# Patient Record
Sex: FEMALE | Race: BLACK OR AFRICAN AMERICAN | NOT HISPANIC OR LATINO | Employment: UNEMPLOYED | URBAN - NONMETROPOLITAN AREA
[De-identification: names, ages, dates, MRNs, and addresses within clinical notes are randomized per-mention and may not be internally consistent; named-entity substitution may affect disease eponyms.]

---
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
## 2023-01-03 ENCOUNTER — ROUTINE PRENATAL (OUTPATIENT)
Dept: OBSTETRICS AND GYNECOLOGY | Facility: CLINIC | Age: 18
End: 2023-01-03
Payer: MEDICAID

## 2023-01-03 ENCOUNTER — PROCEDURE VISIT (OUTPATIENT)
Dept: OBSTETRICS AND GYNECOLOGY | Facility: CLINIC | Age: 18
End: 2023-01-03
Payer: MEDICAID

## 2023-01-03 VITALS
SYSTOLIC BLOOD PRESSURE: 115 MMHG | HEART RATE: 80 BPM | WEIGHT: 167.38 LBS | BODY MASS INDEX: 30.62 KG/M2 | DIASTOLIC BLOOD PRESSURE: 76 MMHG

## 2023-01-03 DIAGNOSIS — R11.2 NAUSEA AND VOMITING, UNSPECIFIED VOMITING TYPE: ICD-10-CM

## 2023-01-03 DIAGNOSIS — Z3A.08 8 WEEKS GESTATION OF PREGNANCY: ICD-10-CM

## 2023-01-03 DIAGNOSIS — Z83.3 FAMILY HISTORY OF DIABETES MELLITUS: ICD-10-CM

## 2023-01-03 DIAGNOSIS — Z3A.08 8 WEEKS GESTATION OF PREGNANCY: Primary | ICD-10-CM

## 2023-01-03 LAB
BILIRUB SERPL-MCNC: NORMAL MG/DL
BLOOD, POC UA: NORMAL
GLUCOSE SERPL-MCNC: 87 MG/DL (ref 74–106)
GLUCOSE UR QL STRIP: NORMAL
KETONES UR QL STRIP: NORMAL
LEUKOCYTE ESTERASE URINE, POC: NORMAL
NITRITE, POC UA: NORMAL
PH, POC UA: 7.5
PROTEIN, POC: NORMAL
SPECIFIC GRAVITY, POC UA: 1.02
UROBILINOGEN, POC UA: 0.2

## 2023-01-03 PROCEDURE — 36415 PR COLLECTION VENOUS BLOOD,VENIPUNCTURE: ICD-10-PCS | Mod: ,,, | Performed by: ADVANCED PRACTICE MIDWIFE

## 2023-01-03 PROCEDURE — 82950 GLUCOSE, 1HR POST PRANDIAL: ICD-10-PCS | Mod: ,,, | Performed by: CLINICAL MEDICAL LABORATORY

## 2023-01-03 PROCEDURE — 82950 GLUCOSE TEST: CPT | Mod: ,,, | Performed by: CLINICAL MEDICAL LABORATORY

## 2023-01-03 PROCEDURE — 76801 PR US, OB <14WKS, TRANSABD, SINGLE GESTATION: ICD-10-PCS | Mod: ,,, | Performed by: OBSTETRICS & GYNECOLOGY

## 2023-01-03 PROCEDURE — 99213 PR OFFICE/OUTPT VISIT, EST, LEVL III, 20-29 MIN: ICD-10-PCS | Mod: 25,TH,, | Performed by: ADVANCED PRACTICE MIDWIFE

## 2023-01-03 PROCEDURE — 99499 UNLISTED E&M SERVICE: CPT | Mod: ,,, | Performed by: OBSTETRICS & GYNECOLOGY

## 2023-01-03 PROCEDURE — 96372 THER/PROPH/DIAG INJ SC/IM: CPT | Mod: ,,, | Performed by: ADVANCED PRACTICE MIDWIFE

## 2023-01-03 PROCEDURE — 96372 PR INJECTION,THERAP/PROPH/DIAG2ST, IM OR SUBCUT: ICD-10-PCS | Mod: ,,, | Performed by: ADVANCED PRACTICE MIDWIFE

## 2023-01-03 PROCEDURE — 76801 OB US < 14 WKS SINGLE FETUS: CPT | Mod: ,,, | Performed by: OBSTETRICS & GYNECOLOGY

## 2023-01-03 PROCEDURE — 99213 OFFICE O/P EST LOW 20 MIN: CPT | Mod: 25,TH,, | Performed by: ADVANCED PRACTICE MIDWIFE

## 2023-01-03 PROCEDURE — 36415 COLL VENOUS BLD VENIPUNCTURE: CPT | Mod: ,,, | Performed by: ADVANCED PRACTICE MIDWIFE

## 2023-01-03 PROCEDURE — 99499 NO LOS: ICD-10-PCS | Mod: ,,, | Performed by: OBSTETRICS & GYNECOLOGY

## 2023-01-03 RX ORDER — ONDANSETRON 2 MG/ML
4 INJECTION INTRAMUSCULAR; INTRAVENOUS ONCE
Status: COMPLETED | OUTPATIENT
Start: 2023-01-03 | End: 2023-01-03

## 2023-01-03 RX ADMIN — ONDANSETRON 4 MG: 2 INJECTION INTRAMUSCULAR; INTRAVENOUS at 03:01

## 2023-01-03 NOTE — PROGRESS NOTES
17 y.o. female  at 8w3d   She c/o prenatal vitamins causing nausea- may take folic acid and Flinstone vitamins until able to tolerate prenatal vitamins  Reports no fetal movement or fluttering. Denies any vaginal bleeding, leakage of fluid, cramping, contractions, or pressure.   Labs reviewed  Total weight gain/weight loss in pregnancy: -0.386 kg (-13.6 oz)     Vitals  BP: 115/76  Pulse: 80  Weight: 75.9 kg (167 lb 6.4 oz)    Prenatal Labs:  Lab Results   Component Value Date    GROUPTRH B POS 2022    HGB 13.6 2022    HCT 41.2 2022     2022    SICKLE Negative 2022    HEPBSAG Non-Reactive 2022    [id number] Non-Reactive 2022    LABNGO Negative 2022    LABURIN Skin/Urogenital Samra Isolated, no further workup. 2022       A: 8w3d           ICD-10-CM ICD-9-CM    1. 8 weeks gestation of pregnancy  Z3A.08 V22.2 POCT URINALYSIS      2. Family history of diabetes mellitus  Z83.3 V18.0 Glucose, 1Hr Post Prandial      3. BMI 30.0-30.9,adult  Z68.30 V85.30           P: Bleeding, daily fetal kick counts, and  labor/labor precautions discussed.    The following were addressed during this visit:    8-12 Weeks  - Review lab tests   - Genetic Counseling (NT/[facility]min[facility])   - Influenza IM (for due date  - 3/31)   - Non-pharmacologic Pain Relief Methods for Labor & Birth       Questions answered to desired level of satisfaction  Verbalized understanding to all information and instructions provided.  Follow up in about 4 weeks (around 2023), or if symptoms worsen or fail to improve, for ESPERANZA visit.    Helen Hoff, ULZ, CNM, WHNP-BC

## 2023-01-31 ENCOUNTER — ROUTINE PRENATAL (OUTPATIENT)
Dept: OBSTETRICS AND GYNECOLOGY | Facility: CLINIC | Age: 18
End: 2023-01-31
Payer: MEDICAID

## 2023-01-31 VITALS
WEIGHT: 159.19 LBS | BODY MASS INDEX: 29.12 KG/M2 | SYSTOLIC BLOOD PRESSURE: 109 MMHG | HEART RATE: 83 BPM | DIASTOLIC BLOOD PRESSURE: 70 MMHG

## 2023-01-31 DIAGNOSIS — B96.89 BV (BACTERIAL VAGINOSIS): ICD-10-CM

## 2023-01-31 DIAGNOSIS — N76.0 BV (BACTERIAL VAGINOSIS): ICD-10-CM

## 2023-01-31 DIAGNOSIS — F12.91 HISTORY OF MARIJUANA USE: ICD-10-CM

## 2023-01-31 DIAGNOSIS — R63.4 WEIGHT LOSS: ICD-10-CM

## 2023-01-31 DIAGNOSIS — Z36.89 ENCOUNTER FOR OTHER SPECIFIED ANTENATAL SCREENING: ICD-10-CM

## 2023-01-31 DIAGNOSIS — Z3A.12 12 WEEKS GESTATION OF PREGNANCY: Primary | ICD-10-CM

## 2023-01-31 LAB
BILIRUB SERPL-MCNC: NORMAL MG/DL
BLOOD, POC UA: NORMAL
CTP QC/QA: YES
GLUCOSE UR QL STRIP: NORMAL
KETONES UR QL STRIP: NORMAL
LEUKOCYTE ESTERASE URINE, POC: NORMAL
NITRITE, POC UA: NORMAL
PH, POC UA: 7
POC (AMP) AMPHETAMINE: NEGATIVE
POC (BAR) BARBITURATES: NEGATIVE
POC (BUP) BUPRENORPHINE: NEGATIVE
POC (BZO) BENZODIAZEPINES: NEGATIVE
POC (COC) COCAINE: NEGATIVE
POC (MDMA) METHYLENEDIOXYMETHAMPHETAMINE 3,4: NEGATIVE
POC (MET) METHAMPHETAMINE: NEGATIVE
POC (MOP) OPIATES: NEGATIVE
POC (MTD) METHADONE: NEGATIVE
POC (OXY) OXYCODONE: NEGATIVE
POC (PCP) PHENCYCLIDINE: NEGATIVE
POC (TCA) TRICYCLIC ANTIDEPRESSANTS: NEGATIVE
POC TEMPERATURE (URINE): 94
POC THC: ABNORMAL
PROTEIN, POC: NORMAL
SPECIFIC GRAVITY, POC UA: 1.02
UROBILINOGEN, POC UA: 0.2

## 2023-01-31 PROCEDURE — 99213 OFFICE O/P EST LOW 20 MIN: CPT | Mod: TH,,, | Performed by: ADVANCED PRACTICE MIDWIFE

## 2023-01-31 PROCEDURE — 80305 POCT URINE DRUG SCREEN PRESUMP: ICD-10-PCS | Mod: QW,,, | Performed by: ADVANCED PRACTICE MIDWIFE

## 2023-01-31 PROCEDURE — 99213 PR OFFICE/OUTPT VISIT, EST, LEVL III, 20-29 MIN: ICD-10-PCS | Mod: TH,,, | Performed by: ADVANCED PRACTICE MIDWIFE

## 2023-01-31 PROCEDURE — 80305 DRUG TEST PRSMV DIR OPT OBS: CPT | Mod: QW,,, | Performed by: ADVANCED PRACTICE MIDWIFE

## 2023-01-31 RX ORDER — METRONIDAZOLE 500 MG/1
500 TABLET ORAL 2 TIMES DAILY
Qty: 14 TABLET | Refills: 0 | Status: SHIPPED | OUTPATIENT
Start: 2023-01-31 | End: 2023-02-07

## 2023-01-31 NOTE — PROGRESS NOTES
17 y.o. female  at 12w3d   She c/o no problems. Requests an ultrasound today- discussed next ultrasound will be for a fetal anatomy scan after 20 weeks gestation- verbalized understanding. panoroma and magda screen today  Reports no fetal movement or fluttering. Denies any vaginal bleeding, leakage of fluid, cramping, contractions, or pressure.   Total weight gain/weight loss in pregnancy: -4.105 kg (-9 lb 0.8 oz)     Vitals  BP: 109/70  Pulse: 83  Weight: 72.2 kg (159 lb 3.2 oz)    Prenatal Labs:  Lab Results   Component Value Date    GROUPTRH B POS 2022    HGB 13.6 2022    HCT 41.2 2022     2022    SICKLE Negative 2022    HEPBSAG Non-Reactive 2022    [id number] Non-Reactive 2022    LABNGO Negative 2022    LABURIN Skin/Urogenital Samra Isolated, no further workup. 2022       A: 12w3d           ICD-10-CM ICD-9-CM    1. 12 weeks gestation of pregnancy  Z3A.12 V22.2 POCT URINALYSIS      2. Encounter for other specified  screening  Z36.89 V28.9 Miscellaneous Test, Sendout Magda      3. Weight loss  R63.4 783.21       4. History of marijuana use  F12.91 305.23           P: Bleeding, daily fetal kick counts, and  labor/labor precautions discussed.    The following were addressed during this visit:    13-16 Weeks  - Quad screen   - Anatomy Ultrasound   - Breastfeeding Concerns & Resources   - Importance of Early Skin to Skin Contact       Questions answered to desired level of satisfaction  Verbalized understanding to all information and instructions provided.  Follow up in about 4 weeks (around 2023), or if symptoms worsen or fail to improve, for ESPERANZA visit.    Helen Hoff, LUZ, CNM, WHNP-BC

## 2023-02-28 ENCOUNTER — ROUTINE PRENATAL (OUTPATIENT)
Dept: OBSTETRICS AND GYNECOLOGY | Facility: CLINIC | Age: 18
End: 2023-02-28
Payer: MEDICAID

## 2023-02-28 VITALS
SYSTOLIC BLOOD PRESSURE: 109 MMHG | HEART RATE: 73 BPM | WEIGHT: 158.63 LBS | DIASTOLIC BLOOD PRESSURE: 73 MMHG | BODY MASS INDEX: 29.01 KG/M2

## 2023-02-28 DIAGNOSIS — O21.9 NAUSEA AND VOMITING IN PREGNANCY: ICD-10-CM

## 2023-02-28 DIAGNOSIS — Z3A.16 16 WEEKS GESTATION OF PREGNANCY: Primary | ICD-10-CM

## 2023-02-28 DIAGNOSIS — R63.4 WEIGHT LOSS: ICD-10-CM

## 2023-02-28 LAB
BILIRUB SERPL-MCNC: NORMAL MG/DL
BLOOD, POC UA: NORMAL
GLUCOSE UR QL STRIP: NORMAL
KETONES UR QL STRIP: NORMAL
LEUKOCYTE ESTERASE URINE, POC: NORMAL
NITRITE, POC UA: NORMAL
PH, POC UA: 7
PROTEIN, POC: NORMAL
SPECIFIC GRAVITY, POC UA: 1.02
UROBILINOGEN, POC UA: 0.2

## 2023-02-28 PROCEDURE — 59425 PR ANTEPARTUM CARE ONLY, 4-6 VISITS: ICD-10-PCS | Mod: TH,,, | Performed by: ADVANCED PRACTICE MIDWIFE

## 2023-02-28 PROCEDURE — 59425 ANTEPARTUM CARE ONLY: CPT | Mod: TH,,, | Performed by: ADVANCED PRACTICE MIDWIFE

## 2023-02-28 RX ORDER — ASPIRIN 81 MG/1
81 TABLET ORAL DAILY
Refills: 0 | Status: ON HOLD
Start: 2023-02-28 | End: 2023-08-17

## 2023-02-28 RX ORDER — ONDANSETRON 8 MG/1
8 TABLET, ORALLY DISINTEGRATING ORAL 3 TIMES DAILY
Qty: 30 TABLET | Refills: 2 | Status: ON HOLD | OUTPATIENT
Start: 2023-02-28 | End: 2023-08-17

## 2023-02-28 NOTE — PROGRESS NOTES
17 y.o. female  at 16w3d   She c/o nausea and vomiting.  Reports no fetal movement or fluttering. Denies any vaginal bleeding, leakage of fluid, cramping, contractions, or pressure.   Total weight gain/weight loss in pregnancy: -4.377 kg (-9 lb 10.4 oz)     Vitals  BP: 109/73  Pulse: 73  Weight: 71.9 kg (158 lb 9.6 oz)  Prenatal  Fundal Height (cm): 16 cm  Fetal Heart Rate: 154  Movement: Absent  Urine Albumin/Glucose  Urine Albumin: Negative  Urine Glucose: Negative  Edema  LLE Edema: None  RLE Edema: None  Facial: None  Additional Edema?: No    Prenatal Labs:  Lab Results   Component Value Date    GROUPTRH B POS 2022    HGB 13.6 2022    HCT 41.2 2022     2022    SICKLE Negative 2022    HEPBSAG Non-Reactive 2022    [id number] Non-Reactive 2022    LABNGO Negative 2022    LABURIN Skin/Urogenital Samra Isolated, no further workup. 2022       A: 16w3d           ICD-10-CM ICD-9-CM    1. 16 weeks gestation of pregnancy  Z3A.16 V22.2 POCT URINALYSIS      ondansetron (ZOFRAN-ODT) 8 MG TbDL      2. Nausea and vomiting in pregnancy  O21.9 643.90 ondansetron (ZOFRAN-ODT) 8 MG TbDL      3. Weight loss  R63.4 783.21           P: Bleeding, daily fetal kick counts, and  labor/labor precautions discussed.    The following were addressed during this visit:    17-20 Weeks  - Quickening   - Lifestyle   - Ultrasound   - Importance of Early and Frequent Breastfeeding   - Baby-led Feeding   - Frequent feeding to help assure optimal milk production       Questions answered to desired level of satisfaction  Verbalized understanding to all information and instructions provided.  Follow up in about 4 weeks (around 3/28/2023), or if symptoms worsen or fail to improve, for NAZIA Hoff, DNP, CNM, WHNP-BC

## 2023-03-26 NOTE — PROCEDURES
Procedures  Ultrasound note:  Uterus 8.05 x 5.52 x 5.28 cm     Right ovary 2.7 x 1.29 x 1.269   Left ovary 2.42 x 1.41 x 1.20 cm     Crown-rump length 8 weeks 1 day   Fetal heart tones 167 beats per minute     Impression:  IUP with fetal heart tones  Estimated gestational age 8 weeks 1 day   Estimated delivery August 14, 2023

## 2023-03-28 ENCOUNTER — ROUTINE PRENATAL (OUTPATIENT)
Dept: OBSTETRICS AND GYNECOLOGY | Facility: CLINIC | Age: 18
End: 2023-03-28
Payer: MEDICAID

## 2023-03-28 VITALS
WEIGHT: 161 LBS | DIASTOLIC BLOOD PRESSURE: 67 MMHG | SYSTOLIC BLOOD PRESSURE: 107 MMHG | BODY MASS INDEX: 29.45 KG/M2 | HEART RATE: 76 BPM

## 2023-03-28 DIAGNOSIS — N89.8 VAGINAL DISCHARGE: ICD-10-CM

## 2023-03-28 DIAGNOSIS — Z3A.20 20 WEEKS GESTATION OF PREGNANCY: Primary | ICD-10-CM

## 2023-03-28 DIAGNOSIS — R63.4 WEIGHT LOSS: ICD-10-CM

## 2023-03-28 LAB
BILIRUB SERPL-MCNC: NORMAL MG/DL
BLOOD, POC UA: NORMAL
CANDIDA SPECIES: NEGATIVE
GARDNERELLA: POSITIVE
GLUCOSE UR QL STRIP: NORMAL
KETONES UR QL STRIP: NORMAL
LEUKOCYTE ESTERASE URINE, POC: NORMAL
NITRITE, POC UA: NORMAL
PH, POC UA: 7
PROTEIN, POC: NORMAL
SPECIFIC GRAVITY, POC UA: 1.02
TRICHOMONAS: NEGATIVE
UROBILINOGEN, POC UA: 0.2

## 2023-03-28 PROCEDURE — 87510 GARDNER VAG DNA DIR PROBE: CPT | Mod: ,,, | Performed by: CLINICAL MEDICAL LABORATORY

## 2023-03-28 PROCEDURE — 87660 TRICHOMONAS VAGIN DIR PROBE: CPT | Mod: ,,, | Performed by: CLINICAL MEDICAL LABORATORY

## 2023-03-28 PROCEDURE — 87480 CANDIDA DNA DIR PROBE: CPT | Mod: ,,, | Performed by: CLINICAL MEDICAL LABORATORY

## 2023-03-28 PROCEDURE — 59425 ANTEPARTUM CARE ONLY: CPT | Mod: TH,,, | Performed by: ADVANCED PRACTICE MIDWIFE

## 2023-03-28 PROCEDURE — 87510 BACTERIAL VAGINOSIS: ICD-10-PCS | Mod: ,,, | Performed by: CLINICAL MEDICAL LABORATORY

## 2023-03-28 PROCEDURE — 87480 BACTERIAL VAGINOSIS: ICD-10-PCS | Mod: ,,, | Performed by: CLINICAL MEDICAL LABORATORY

## 2023-03-28 PROCEDURE — 59425 PR ANTEPARTUM CARE ONLY, 4-6 VISITS: ICD-10-PCS | Mod: TH,,, | Performed by: ADVANCED PRACTICE MIDWIFE

## 2023-03-28 PROCEDURE — 87660 BACTERIAL VAGINOSIS: ICD-10-PCS | Mod: ,,, | Performed by: CLINICAL MEDICAL LABORATORY

## 2023-03-28 NOTE — PROGRESS NOTES
T:  98.0      02%:  99   Normal vision: sees adequately in most situations; can see medication labels, newsprint

## 2023-03-28 NOTE — PROGRESS NOTES
17 y.o. female  at 20w3d   She c/o vaginal discharge and cramping. States vaginal discharge started Thursday and is now improving. Denies any vaginal odor with discharge.  Reports good fetal movement or fluttering. Denies any vaginal bleeding, leakage of fluid, cramping, contractions, or pressure.   Total weight gain/weight loss in pregnancy: -3.289 kg (-7 lb 4 oz)     Vitals  BP: 107/67  Pulse: 76  Weight: 73 kg (161 lb)  Prenatal  Movement: Present  Urine Albumin/Glucose  Urine Albumin: Negative  Urine Glucose: Negative  Edema  LLE Edema: None  RLE Edema: None  Facial: None  Additional Edema?: No    Prenatal Labs:  Lab Results   Component Value Date    GROUPTRH B POS 2022    HGB 13.6 2022    HCT 41.2 2022     2022    SICKLE Negative 2022    HEPBSAG Non-Reactive 2022    [id number] Non-Reactive 2022    LABNGO Negative 2022    LABURIN Skin/Urogenital Samra Isolated, no further workup. 2022       A: 20w3d           ICD-10-CM ICD-9-CM    1. 20 weeks gestation of pregnancy  Z3A.20 V22.2 POCT URINALYSIS      US OB 14+ Wks, TransAbd, Single Gestation      2. Vaginal discharge  N89.8 623.5 Bacterial Vaginosis      3. Weight loss  R63.4 783.21           P: Bleeding, daily fetal kick counts, and  labor/labor precautions discussed.    The following were addressed during this visit:    21-24 Weeks  -  Labor Signs   - Travel During Pregnancy   - Gestational diabetes screening protocol   - Effective Position and Latch   - Risks of Formula Use   - Risks of pacifier use       Questions answered to desired level of satisfaction  Verbalized understanding to all information and instructions provided.  Follow up in about 4 weeks (around 2023), or if symptoms worsen or fail to improve, for NAZIA Hoff, DNP, CNM, WHNP-BC

## 2023-03-29 DIAGNOSIS — N76.0 BV (BACTERIAL VAGINOSIS): Primary | ICD-10-CM

## 2023-03-29 DIAGNOSIS — B96.89 BV (BACTERIAL VAGINOSIS): Primary | ICD-10-CM

## 2023-03-29 RX ORDER — METRONIDAZOLE 500 MG/1
500 TABLET ORAL 2 TIMES DAILY
Qty: 14 TABLET | Refills: 0 | Status: SHIPPED | OUTPATIENT
Start: 2023-03-29 | End: 2023-04-05

## 2023-04-18 ENCOUNTER — HOSPITAL ENCOUNTER (OUTPATIENT)
Dept: RADIOLOGY | Facility: HOSPITAL | Age: 18
Discharge: HOME | End: 2023-04-18
Attending: ADVANCED PRACTICE MIDWIFE
Payer: MEDICAID

## 2023-04-18 DIAGNOSIS — Z3A.20 20 WEEKS GESTATION OF PREGNANCY: ICD-10-CM

## 2023-04-18 PROCEDURE — 76805 US OB 14+ WEEKS, TRANSABDOM, SINGLE GESTATION: ICD-10-PCS | Mod: 26,,, | Performed by: RADIOLOGY

## 2023-04-18 PROCEDURE — 76805 OB US >/= 14 WKS SNGL FETUS: CPT | Mod: TC

## 2023-04-18 PROCEDURE — 76805 OB US >/= 14 WKS SNGL FETUS: CPT | Mod: 26,,, | Performed by: RADIOLOGY

## 2023-04-25 ENCOUNTER — ROUTINE PRENATAL (OUTPATIENT)
Dept: OBSTETRICS AND GYNECOLOGY | Facility: CLINIC | Age: 18
End: 2023-04-25
Payer: MEDICAID

## 2023-04-25 VITALS
DIASTOLIC BLOOD PRESSURE: 70 MMHG | WEIGHT: 162.81 LBS | BODY MASS INDEX: 29.78 KG/M2 | HEART RATE: 80 BPM | SYSTOLIC BLOOD PRESSURE: 112 MMHG

## 2023-04-25 DIAGNOSIS — Z3A.24 24 WEEKS GESTATION OF PREGNANCY: Primary | ICD-10-CM

## 2023-04-25 LAB
BILIRUB SERPL-MCNC: NORMAL MG/DL
BLOOD, POC UA: NORMAL
GLUCOSE UR QL STRIP: NORMAL
KETONES UR QL STRIP: NORMAL
LEUKOCYTE ESTERASE URINE, POC: NORMAL
NITRITE, POC UA: NORMAL
PH, POC UA: 7.5
PROTEIN, POC: NORMAL
SPECIFIC GRAVITY, POC UA: 1.02
UROBILINOGEN, POC UA: 1

## 2023-04-25 PROCEDURE — 59425 PR ANTEPARTUM CARE ONLY, 4-6 VISITS: ICD-10-PCS | Mod: TH,,, | Performed by: ADVANCED PRACTICE MIDWIFE

## 2023-04-25 PROCEDURE — 59425 ANTEPARTUM CARE ONLY: CPT | Mod: TH,,, | Performed by: ADVANCED PRACTICE MIDWIFE

## 2023-04-25 NOTE — PROGRESS NOTES
17 y.o. female  at 24w3d   She c/o being bit by a tick on this past . She denies any pain, n/v, headache, miscle discomforts or other problems  Reports good fetal movement or fluttering. Denies any vaginal bleeding, leakage of fluid, cramping, contractions, or pressure.   Total weight gain/weight loss in pregnancy: -2.472 kg (-5 lb 7.2 oz)     Vitals  BP: 112/70  Pulse: 80  Weight: 73.8 kg (162 lb 12.8 oz)    Prenatal Labs:  Lab Results   Component Value Date    GROUPTRH B POS 2022    HGB 13.6 2022    HCT 41.2 2022     2022    SICKLE Negative 2022    HEPBSAG Non-Reactive 2022    [id number] Non-Reactive 2022    LABNGO Negative 2022    LABURIN Skin/Urogenital Samra Isolated, no further workup. 2022       A: 24w3d           ICD-10-CM ICD-9-CM    1. 24 weeks gestation of pregnancy  Z3A.24 V22.2 POCT URINALYSIS          P: Bleeding, daily fetal kick counts, and  labor/labor precautions discussed.    No pregnancy checklist tasks were completed during this visit, and no tasks are pending completion.      Questions answered to desired level of satisfaction  Verbalized understanding to all information and instructions provided.  Follow up in about 4 weeks (around 2023), or if symptoms worsen or fail to improve, for ESPERANZA, 1 hr gtt.    Helen Hoff, LUZ, CNM, WHNP-BC

## 2023-05-23 ENCOUNTER — ROUTINE PRENATAL (OUTPATIENT)
Dept: OBSTETRICS AND GYNECOLOGY | Facility: CLINIC | Age: 18
End: 2023-05-23
Payer: MEDICAID

## 2023-05-23 VITALS
HEART RATE: 89 BPM | SYSTOLIC BLOOD PRESSURE: 106 MMHG | BODY MASS INDEX: 30.73 KG/M2 | WEIGHT: 168 LBS | DIASTOLIC BLOOD PRESSURE: 66 MMHG

## 2023-05-23 DIAGNOSIS — R82.5 POSITIVE URINE DRUG SCREEN: ICD-10-CM

## 2023-05-23 DIAGNOSIS — Z3A.28 28 WEEKS GESTATION OF PREGNANCY: Primary | ICD-10-CM

## 2023-05-23 DIAGNOSIS — F12.91 HISTORY OF MARIJUANA USE: ICD-10-CM

## 2023-05-23 LAB
BASOPHILS # BLD AUTO: 0.06 K/UL (ref 0–0.2)
BASOPHILS NFR BLD AUTO: 0.5 % (ref 0–1)
BILIRUB SERPL-MCNC: NORMAL MG/DL
BLOOD, POC UA: NORMAL
CTP QC/QA: YES
DIFFERENTIAL METHOD BLD: ABNORMAL
EOSINOPHIL # BLD AUTO: 0.07 K/UL (ref 0–0.5)
EOSINOPHIL NFR BLD AUTO: 0.5 % (ref 1–4)
ERYTHROCYTE [DISTWIDTH] IN BLOOD BY AUTOMATED COUNT: 12.1 % (ref 11.5–14.5)
GLUCOSE SERPL-MCNC: 58 MG/DL (ref 74–106)
GLUCOSE UR QL STRIP: NORMAL
HCT VFR BLD AUTO: 33.4 % (ref 38–47)
HGB BLD-MCNC: 10.9 G/DL (ref 12–16)
IMM GRANULOCYTES # BLD AUTO: 0.13 K/UL (ref 0–0.04)
IMM GRANULOCYTES NFR BLD: 1 % (ref 0–0.4)
KETONES UR QL STRIP: NORMAL
LEUKOCYTE ESTERASE URINE, POC: NORMAL
LYMPHOCYTES # BLD AUTO: 2.87 K/UL (ref 1–4.8)
LYMPHOCYTES NFR BLD AUTO: 21.5 % (ref 27–41)
MCH RBC QN AUTO: 31.3 PG (ref 27–31)
MCHC RBC AUTO-ENTMCNC: 32.6 G/DL (ref 32–36)
MCV RBC AUTO: 96 FL (ref 80–96)
MONOCYTES # BLD AUTO: 1.28 K/UL (ref 0–0.8)
MONOCYTES NFR BLD AUTO: 9.6 % (ref 2–6)
MPC BLD CALC-MCNC: 10.7 FL (ref 9.4–12.4)
NEUTROPHILS # BLD AUTO: 8.91 K/UL (ref 1.8–7.7)
NEUTROPHILS NFR BLD AUTO: 66.9 % (ref 53–65)
NITRITE, POC UA: NORMAL
NRBC # BLD AUTO: 0 X10E3/UL
NRBC, AUTO (.00): 0 %
PH, POC UA: 7
PLATELET # BLD AUTO: 224 K/UL (ref 150–400)
POC (AMP) AMPHETAMINE: NEGATIVE
POC (BAR) BARBITURATES: NEGATIVE
POC (BUP) BUPRENORPHINE: NEGATIVE
POC (BZO) BENZODIAZEPINES: NEGATIVE
POC (COC) COCAINE: NEGATIVE
POC (MDMA) METHYLENEDIOXYMETHAMPHETAMINE 3,4: NEGATIVE
POC (MET) METHAMPHETAMINE: NEGATIVE
POC (MOP) OPIATES: NEGATIVE
POC (MTD) METHADONE: NEGATIVE
POC (OXY) OXYCODONE: NEGATIVE
POC (PCP) PHENCYCLIDINE: NEGATIVE
POC (TCA) TRICYCLIC ANTIDEPRESSANTS: NEGATIVE
POC TEMPERATURE (URINE): 94
POC THC: ABNORMAL
PROTEIN, POC: 30
RBC # BLD AUTO: 3.48 M/UL (ref 4.2–5.4)
SPECIFIC GRAVITY, POC UA: 1.02
UROBILINOGEN, POC UA: 0.2
WBC # BLD AUTO: 13.32 K/UL (ref 4.5–11)

## 2023-05-23 PROCEDURE — 59426 ANTEPARTUM CARE ONLY: CPT | Mod: TH,,, | Performed by: ADVANCED PRACTICE MIDWIFE

## 2023-05-23 PROCEDURE — 82950 GLUCOSE TEST: CPT | Mod: ,,, | Performed by: CLINICAL MEDICAL LABORATORY

## 2023-05-23 PROCEDURE — 85025 CBC WITH DIFFERENTIAL: ICD-10-PCS | Mod: ,,, | Performed by: CLINICAL MEDICAL LABORATORY

## 2023-05-23 PROCEDURE — 80305 POCT URINE DRUG SCREEN PRESUMP: ICD-10-PCS | Mod: QW,,, | Performed by: ADVANCED PRACTICE MIDWIFE

## 2023-05-23 PROCEDURE — 36415 COLL VENOUS BLD VENIPUNCTURE: CPT | Mod: ,,, | Performed by: ADVANCED PRACTICE MIDWIFE

## 2023-05-23 PROCEDURE — 59426 PR ANTEPARTUM CARE ONLY, >7 VISITS: ICD-10-PCS | Mod: TH,,, | Performed by: ADVANCED PRACTICE MIDWIFE

## 2023-05-23 PROCEDURE — 85025 COMPLETE CBC W/AUTO DIFF WBC: CPT | Mod: ,,, | Performed by: CLINICAL MEDICAL LABORATORY

## 2023-05-23 PROCEDURE — 80305 DRUG TEST PRSMV DIR OPT OBS: CPT | Mod: QW,,, | Performed by: ADVANCED PRACTICE MIDWIFE

## 2023-05-23 PROCEDURE — 82950 GLUCOSE, 1HR POST PRANDIAL: ICD-10-PCS | Mod: ,,, | Performed by: CLINICAL MEDICAL LABORATORY

## 2023-05-23 PROCEDURE — 36415 PR COLLECTION VENOUS BLOOD,VENIPUNCTURE: ICD-10-PCS | Mod: ,,, | Performed by: ADVANCED PRACTICE MIDWIFE

## 2023-05-23 NOTE — PROGRESS NOTES
T: 98.1      02%:  99  17 y.o. female  at 28w3d   She c/o no problems. 1 hr gtt today. Admits to smoking marijuana.  Reports good fetal movement or fluttering. Denies any vaginal bleeding, leakage of fluid, cramping, contractions, or pressure.   Total weight gain/weight loss in pregnancy: -0.114 kg (-4 oz)     Vitals  BP: 106/66  Pulse: 89  Weight: 76.2 kg (168 lb)  Prenatal  Fundal Height (cm): 28 cm  Fetal Heart Rate: 141  Movement: Present  Urine Albumin/Glucose  Urine Albumin: 1+  Urine Glucose: Negative  Edema  LLE Edema: None  RLE Edema: None  Facial: None  Additional Edema?: No    Prenatal Labs:  Lab Results   Component Value Date    GROUPTRH B POS 2022    HGB 13.6 2022    HCT 41.2 2022     2022    SICKLE Negative 2022    HEPBSAG Non-Reactive 2022    [id number] Non-Reactive 2022    LABNGO Negative 2022    LABURIN Skin/Urogenital Samra Isolated, no further workup. 2022       A: 28w3d           ICD-10-CM ICD-9-CM    1. 28 weeks gestation of pregnancy  Z3A.28 V22.2 POCT URINALYSIS      POCT Urine Drug Screen Presump      Glucose, 1Hr Post Prandial      CBC Auto Differential      Glucose, 1Hr Post Prandial      CBC Auto Differential      2. Positive urine drug screen  R82.5 796.0       3. History of marijuana use  F12.91 305.23           P: Bleeding, daily fetal kick counts, and  labor/labor precautions discussed.    The following were addressed during this visit:    25-28 Weeks  -  Labor Signs   - Childbirth Education   - Maternity Leave paperwork   - Smoking Intervention   - Weight Gain/Diet/Exercise   - Rhogam Given   - Rooming in baby during your hospital stay   marijuana cessation encouraged  Marijuana in pregnancy can cross the placenta and can cause a decrease in the birth weight of the baby at birth. Marijuana use in pregnancy has been noted to increase the risk of stillbirth (death of the baby inside of the uterus), increase  risk of  birth. Other adverse  outcomes for uses of marijuana in pregnancy can include hypoglycemia, sepsis (infection), anencephaly, esophageal atresia, diaphragmatic hernia, and and increased risk of gastroeshesis. Marijuana use in pregnancy can cause the offspring/child of the marijuana user to have an increased risk of hyperactivity, increased behavioral problems, poor performance on visual perception tasks, language comprehension and sustained attention and memory difficulties, decreased verbal reasoning, and increased impulsivity. Offspring of marijuana users were demonstrated to have scored lower in academic performance in reading and spelling.  Marijuana can increased the risk of nausea and vomiting.  Questions answered to desired level of satisfaction  Verbalized understanding to all information and instructions provided.  Follow up in about 2 weeks (around 2023), or if symptoms worsen or fail to improve, for ESPERANZA visit.    Helen Hoff, DNP, CNM, WHNP-BC

## 2023-05-30 DIAGNOSIS — D64.9 ANEMIA, UNSPECIFIED TYPE: Primary | ICD-10-CM

## 2023-05-30 RX ORDER — FERROUS SULFATE 325(65) MG
325 TABLET, DELAYED RELEASE (ENTERIC COATED) ORAL 2 TIMES DAILY
Qty: 30 TABLET | Refills: 4 | Status: SHIPPED | OUTPATIENT
Start: 2023-05-30 | End: 2023-07-17 | Stop reason: SDUPTHER

## 2023-06-14 ENCOUNTER — ROUTINE PRENATAL (OUTPATIENT)
Dept: OBSTETRICS AND GYNECOLOGY | Facility: CLINIC | Age: 18
End: 2023-06-14
Payer: MEDICAID

## 2023-06-14 VITALS
HEART RATE: 90 BPM | SYSTOLIC BLOOD PRESSURE: 112 MMHG | BODY MASS INDEX: 30.62 KG/M2 | WEIGHT: 167.38 LBS | DIASTOLIC BLOOD PRESSURE: 70 MMHG

## 2023-06-14 DIAGNOSIS — Z3A.31 31 WEEKS GESTATION OF PREGNANCY: Primary | ICD-10-CM

## 2023-06-14 DIAGNOSIS — N89.8 VAGINAL IRRITATION: ICD-10-CM

## 2023-06-14 LAB
BILIRUB SERPL-MCNC: NORMAL MG/DL
BLOOD, POC UA: NORMAL
CANDIDA SPECIES: POSITIVE
GARDNERELLA: NEGATIVE
GLUCOSE UR QL STRIP: NORMAL
KETONES UR QL STRIP: 15
LEUKOCYTE ESTERASE URINE, POC: NORMAL
NITRITE, POC UA: NORMAL
PH, POC UA: 6.5
PROTEIN, POC: NORMAL
SPECIFIC GRAVITY, POC UA: 1.02
TRICHOMONAS: NEGATIVE
UROBILINOGEN, POC UA: 0.2

## 2023-06-14 PROCEDURE — 59426 ANTEPARTUM CARE ONLY: CPT | Mod: TH,,, | Performed by: ADVANCED PRACTICE MIDWIFE

## 2023-06-14 PROCEDURE — 59426 PR ANTEPARTUM CARE ONLY, >7 VISITS: ICD-10-PCS | Mod: TH,,, | Performed by: ADVANCED PRACTICE MIDWIFE

## 2023-06-14 PROCEDURE — 87510 BACTERIAL VAGINOSIS: ICD-10-PCS | Mod: ,,, | Performed by: CLINICAL MEDICAL LABORATORY

## 2023-06-14 PROCEDURE — 87660 BACTERIAL VAGINOSIS: ICD-10-PCS | Mod: ,,, | Performed by: CLINICAL MEDICAL LABORATORY

## 2023-06-14 PROCEDURE — 87660 TRICHOMONAS VAGIN DIR PROBE: CPT | Mod: ,,, | Performed by: CLINICAL MEDICAL LABORATORY

## 2023-06-14 PROCEDURE — 87480 BACTERIAL VAGINOSIS: ICD-10-PCS | Mod: ,,, | Performed by: CLINICAL MEDICAL LABORATORY

## 2023-06-14 PROCEDURE — 87510 GARDNER VAG DNA DIR PROBE: CPT | Mod: ,,, | Performed by: CLINICAL MEDICAL LABORATORY

## 2023-06-14 PROCEDURE — 87480 CANDIDA DNA DIR PROBE: CPT | Mod: ,,, | Performed by: CLINICAL MEDICAL LABORATORY

## 2023-06-14 RX ORDER — TERCONAZOLE 4 MG/G
1 CREAM VAGINAL NIGHTLY
Qty: 45 G | Refills: 0 | Status: SHIPPED | OUTPATIENT
Start: 2023-06-14 | End: 2023-06-19

## 2023-06-14 RX ORDER — METRONIDAZOLE 500 MG/1
500 TABLET ORAL 2 TIMES DAILY
Qty: 14 TABLET | Refills: 0 | Status: SHIPPED | OUTPATIENT
Start: 2023-06-14 | End: 2023-06-21

## 2023-06-14 NOTE — PROGRESS NOTES
T:  98.0      02%:  99  17 y.o. female  at 31w4d   She c/o vaginal irritation with itching  Reports good fetal movement or fluttering. Denies any vaginal bleeding, leakage of fluid, cramping, contractions, or pressure.   Total weight gain/weight loss in pregnancy: -0.386 kg (-13.6 oz)     Vitals  BP: 112/70  Pulse: 90  Weight: 75.9 kg (167 lb 6.4 oz)  Prenatal  Fundal Height (cm): 32 cm  Fetal Heart Rate: 131    Prenatal Labs:  Lab Results   Component Value Date    GROUPTRH B POS 2022    HGB 10.9 (L) 2023    HCT 33.4 (L) 2023     2023    SICKLE Negative 2022    HEPBSAG Non-Reactive 2022    [id number] Non-Reactive 2022    LABNGO Negative 2022    LABURIN Skin/Urogenital Samra Isolated, no further workup. 2022       A: 31w4d           ICD-10-CM ICD-9-CM    1. 31 weeks gestation of pregnancy  Z3A.31 V22.2 POCT URINALYSIS      2. Vaginal irritation  N89.8 623.9 Bacterial Vaginosis      terconazole (TERAZOL 7) 0.4 % Crea      metroNIDAZOLE (FLAGYL) 500 MG tablet          P: Bleeding, daily fetal kick counts, and  labor/labor precautions discussed.    The following were addressed during this visit:    29-32 Weeks  - Tdap Given   - Contraception/Tubal Consent   - Pre-registration   - Circumsision plans   - Op note review/ consent   - Birth Plan   - Pediatrician   - Fetal Kick Counts/PIH/PTL precautions   - Preeclampsia Education   - Quiet time       Questions answered to desired level of satisfaction  Verbalized understanding to all information and instructions provided.  Follow up in about 2 weeks (around 2023), or if symptoms worsen or fail to improve, for NAZIA Hoff, DNP, CNM, WHNP-BC

## 2023-06-15 ENCOUNTER — OFFICE VISIT (OUTPATIENT)
Dept: FAMILY MEDICINE | Facility: CLINIC | Age: 18
End: 2023-06-15
Payer: MEDICAID

## 2023-06-15 VITALS
HEIGHT: 62 IN | TEMPERATURE: 98 F | OXYGEN SATURATION: 98 % | HEART RATE: 83 BPM | DIASTOLIC BLOOD PRESSURE: 59 MMHG | RESPIRATION RATE: 18 BRPM | SYSTOLIC BLOOD PRESSURE: 99 MMHG | BODY MASS INDEX: 30.55 KG/M2 | WEIGHT: 166 LBS

## 2023-06-15 DIAGNOSIS — Z3A.31 31 WEEKS GESTATION OF PREGNANCY: ICD-10-CM

## 2023-06-15 DIAGNOSIS — S90.01XA CONTUSION OF RIGHT ANKLE, INITIAL ENCOUNTER: Primary | ICD-10-CM

## 2023-06-15 PROCEDURE — 3078F PR MOST RECENT DIASTOLIC BLOOD PRESSURE < 80 MM HG: ICD-10-PCS | Mod: CPTII,,, | Performed by: NURSE PRACTITIONER

## 2023-06-15 PROCEDURE — 99213 OFFICE O/P EST LOW 20 MIN: CPT | Mod: ,,, | Performed by: NURSE PRACTITIONER

## 2023-06-15 PROCEDURE — 3008F BODY MASS INDEX DOCD: CPT | Mod: CPTII,,, | Performed by: NURSE PRACTITIONER

## 2023-06-15 PROCEDURE — 1160F RVW MEDS BY RX/DR IN RCRD: CPT | Mod: CPTII,,, | Performed by: NURSE PRACTITIONER

## 2023-06-15 PROCEDURE — 1159F MED LIST DOCD IN RCRD: CPT | Mod: CPTII,,, | Performed by: NURSE PRACTITIONER

## 2023-06-15 PROCEDURE — 3074F PR MOST RECENT SYSTOLIC BLOOD PRESSURE < 130 MM HG: ICD-10-PCS | Mod: CPTII,,, | Performed by: NURSE PRACTITIONER

## 2023-06-15 PROCEDURE — 1159F PR MEDICATION LIST DOCUMENTED IN MEDICAL RECORD: ICD-10-PCS | Mod: CPTII,,, | Performed by: NURSE PRACTITIONER

## 2023-06-15 PROCEDURE — 3074F SYST BP LT 130 MM HG: CPT | Mod: CPTII,,, | Performed by: NURSE PRACTITIONER

## 2023-06-15 PROCEDURE — 3008F PR BODY MASS INDEX (BMI) DOCUMENTED: ICD-10-PCS | Mod: CPTII,,, | Performed by: NURSE PRACTITIONER

## 2023-06-15 PROCEDURE — 3078F DIAST BP <80 MM HG: CPT | Mod: CPTII,,, | Performed by: NURSE PRACTITIONER

## 2023-06-15 PROCEDURE — 99213 PR OFFICE/OUTPT VISIT, EST, LEVL III, 20-29 MIN: ICD-10-PCS | Mod: ,,, | Performed by: NURSE PRACTITIONER

## 2023-06-15 PROCEDURE — 1160F PR REVIEW ALL MEDS BY PRESCRIBER/CLIN PHARMACIST DOCUMENTED: ICD-10-PCS | Mod: CPTII,,, | Performed by: NURSE PRACTITIONER

## 2023-06-15 NOTE — PROGRESS NOTES
Bonita Gao DNP, FNP    [address] [place] [address]     PATIENT NAME: Shayy Mehta  : 2005  DATE: 6/15/23  MRN: [id number]      Billing Provider: Bonita Gao DNP, FNP  Level of Service:   Patient PCP Information       Provider PCP Type    Bonita Gao DNP, FNP General            Reason for Visit / Chief Complaint: Ankle Injury (States she hit her ankle 2 weeks ago and has not been able to stand and put pressure on it since. )       Update PCP  Update Chief Complaint         History of Present Illness / Problem Focused Workflow     Shayy Mehta presents to the clinic with Ankle Injury (States she hit her ankle 2 weeks ago and has not been able to stand and put pressure on it since. )     Ankle Injury     Review of Systems     Review of Systems   Constitutional:  Negative for activity change, appetite change, chills, fatigue and fever.   HENT:  Negative for nasal congestion, ear pain, hearing loss, postnasal drip and sore throat.    Respiratory:  Negative for cough, chest tightness, shortness of breath and wheezing.    Cardiovascular:  Negative for chest pain, palpitations, leg swelling and claudication.   Gastrointestinal:  Negative for abdominal pain, change in bowel habit, constipation, diarrhea, nausea, vomiting and change in bowel habit.   Genitourinary:  Negative for dysuria.   Musculoskeletal:  Negative for arthralgias, back pain, gait problem and myalgias.   Integumentary:  Negative for rash.   Neurological:  Negative for weakness and headaches.   Psychiatric/Behavioral:  Negative for suicidal ideas. The patient is not nervous/anxious.       Medical / Social / Family History     Past Medical History:   Diagnosis Date    Asthma        Past Surgical History:   Procedure Laterality Date    WISDOM TOOTH EXTRACTION         Social History  Ms. Shayy Mehta  reports that she has never smoked. She has never used smokeless tobacco. She  "reports that she does not drink alcohol and does not use drugs.    Family History  Ms. Shayy Mehta's family history includes Breast cancer in an other family member; Diabetes in her paternal grandmother and another family member; Hypertension in an other family member.    Medications and Allergies     Medications  Outpatient Medications Marked as Taking for the 6/15/23 encounter (Office Visit) with Bonita Gao, DNP, FNP   Medication Sig Dispense Refill    ferrous sulfate 325 (65 FE) MG EC tablet Take 1 tablet (325 mg total) by mouth 2 (two) times daily. 30 tablet 4    metroNIDAZOLE (FLAGYL) 500 MG tablet Take 1 tablet (500 mg total) by mouth 2 (two) times daily. for 7 days 14 tablet 0    ondansetron (ZOFRAN-ODT) 8 MG TbDL Take 1 tablet (8 mg total) by mouth 3 (three) times daily. 30 tablet 2    terconazole (TERAZOL 7) 0.4 % Crea Place 1 applicator vaginally every evening. for 5 days 45 g 0       Allergies  Review of patient's allergies indicates:  No Known Allergies    Physical Examination     Vitals:    06/15/23 1445   BP: (!) 99/59   BP Location: Right arm   Patient Position: Sitting   BP Method: Large (Automatic)   Pulse: 83   Resp: 18   Temp: 98 °F (36.7 °C)   TempSrc: Oral   SpO2: 98%   Weight: 75.3 kg (166 lb)   Height: 5' 2" (1.575 m)     Physical Exam  Vitals and nursing note reviewed.   Constitutional:       General: She is not in acute distress.     Appearance: Normal appearance. She is not ill-appearing.   Eyes:      Extraocular Movements: Extraocular movements intact.      Pupils: Pupils are equal, round, and reactive to light.   Cardiovascular:      Rate and Rhythm: Normal rate and regular rhythm.      Heart sounds: Normal heart sounds.   Pulmonary:      Effort: Pulmonary effort is normal.      Breath sounds: Normal breath sounds.   Abdominal:      General: Bowel sounds are normal.      Palpations: Abdomen is soft.   Musculoskeletal:         General: Normal range of motion.   Skin:     Findings: " No rash.   Neurological:      General: No focal deficit present.      Mental Status: She is alert and oriented to person, place, and time. Mental status is at baseline.   Psychiatric:         Mood and Affect: Mood normal.         Behavior: Behavior normal.        Assessment and Plan (including Health Maintenance)      Problem List  Smart Sets  Document Outside HM   :    Plan:         Health Maintenance Due   Topic Date Due    Hepatitis C Screening  Never done    Lipid Panel  Never done    COVID-19 Vaccine (3 - Pfizer series) 07/01/2021    TETANUS VACCINE  Never done       Problem List Items Addressed This Visit    None  Visit Diagnoses       Contusion of right ankle, initial encounter    -  Primary    31 weeks gestation of pregnancy              Contusion of right ankle, initial encounter    31 weeks gestation of pregnancy       Health Maintenance Topics with due status: Not Due       Topic Last Completion Date    Chlamydia Screening 12/06/2022    Influenza Vaccine Not Due           Future Appointments   Date Time Provider Department Center   6/28/2023  9:30 AM Helen Hoff CNM [facility]        Follow up if symptoms worsen or fail to improve.     Signature:  Bonita Gao DNP, FNP  [address] [place] [address]  Phone #: [phone]  Fax #: [phone]    Date of encounter: 6/15/23    Patient Instructions   Ice to right medial ankle. Acetaminophen as needed for pain. Call OB GYN regarding concerns of parvo.

## 2023-06-15 NOTE — PATIENT INSTRUCTIONS
Ice to right medial ankle. Acetaminophen as needed for pain. Call OB GYN regarding concerns of parvo.

## 2023-06-28 ENCOUNTER — ROUTINE PRENATAL (OUTPATIENT)
Dept: OBSTETRICS AND GYNECOLOGY | Facility: CLINIC | Age: 18
End: 2023-06-28
Payer: MEDICAID

## 2023-06-28 VITALS
DIASTOLIC BLOOD PRESSURE: 61 MMHG | HEART RATE: 90 BPM | WEIGHT: 166.38 LBS | SYSTOLIC BLOOD PRESSURE: 106 MMHG | BODY MASS INDEX: 30.43 KG/M2

## 2023-06-28 DIAGNOSIS — R63.4 WEIGHT LOSS: ICD-10-CM

## 2023-06-28 DIAGNOSIS — Z3A.33 33 WEEKS GESTATION OF PREGNANCY: Primary | ICD-10-CM

## 2023-06-28 DIAGNOSIS — O60.00 PRETERM LABOR WITHOUT DELIVERY: ICD-10-CM

## 2023-06-28 DIAGNOSIS — N89.8 VAGINAL DISCHARGE: ICD-10-CM

## 2023-06-28 DIAGNOSIS — R82.4 KETONURIA: ICD-10-CM

## 2023-06-28 LAB
BILIRUB SERPL-MCNC: NORMAL MG/DL
BLOOD, POC UA: NORMAL
CANDIDA SPECIES: NEGATIVE
GARDNERELLA: NEGATIVE
GLUCOSE UR QL STRIP: NORMAL
KETONES UR QL STRIP: 15
LEUKOCYTE ESTERASE URINE, POC: NORMAL
NITRITE, POC UA: NORMAL
PH, POC UA: 7
PROTEIN, POC: NORMAL
SPECIFIC GRAVITY, POC UA: 1.01
TRICHOMONAS: NEGATIVE
UROBILINOGEN, POC UA: 1

## 2023-06-28 PROCEDURE — 59426 PR ANTEPARTUM CARE ONLY, >7 VISITS: ICD-10-PCS | Mod: TH,,, | Performed by: ADVANCED PRACTICE MIDWIFE

## 2023-06-28 PROCEDURE — 87510 GARDNER VAG DNA DIR PROBE: CPT | Mod: ,,, | Performed by: CLINICAL MEDICAL LABORATORY

## 2023-06-28 PROCEDURE — 87480 CANDIDA DNA DIR PROBE: CPT | Mod: ,,, | Performed by: CLINICAL MEDICAL LABORATORY

## 2023-06-28 PROCEDURE — 96372 THER/PROPH/DIAG INJ SC/IM: CPT | Mod: 59,,, | Performed by: ADVANCED PRACTICE MIDWIFE

## 2023-06-28 PROCEDURE — 87660 TRICHOMONAS VAGIN DIR PROBE: CPT | Mod: ,,, | Performed by: CLINICAL MEDICAL LABORATORY

## 2023-06-28 PROCEDURE — 59426 ANTEPARTUM CARE ONLY: CPT | Mod: TH,,, | Performed by: ADVANCED PRACTICE MIDWIFE

## 2023-06-28 PROCEDURE — 87480 BACTERIAL VAGINOSIS: ICD-10-PCS | Mod: ,,, | Performed by: CLINICAL MEDICAL LABORATORY

## 2023-06-28 PROCEDURE — 87510 BACTERIAL VAGINOSIS: ICD-10-PCS | Mod: ,,, | Performed by: CLINICAL MEDICAL LABORATORY

## 2023-06-28 PROCEDURE — 87660 BACTERIAL VAGINOSIS: ICD-10-PCS | Mod: ,,, | Performed by: CLINICAL MEDICAL LABORATORY

## 2023-06-28 PROCEDURE — 96372 PR INJECTION,THERAP/PROPH/DIAG2ST, IM OR SUBCUT: ICD-10-PCS | Mod: 59,,, | Performed by: ADVANCED PRACTICE MIDWIFE

## 2023-06-28 RX ORDER — NIFEDIPINE 10 MG/1
10 CAPSULE ORAL EVERY 6 HOURS
Qty: 70 CAPSULE | Refills: 0 | Status: ON HOLD | OUTPATIENT
Start: 2023-06-28 | End: 2023-08-17 | Stop reason: HOSPADM

## 2023-06-28 RX ORDER — BETAMETHASONE SODIUM PHOSPHATE AND BETAMETHASONE ACETATE 3; 3 MG/ML; MG/ML
12 INJECTION, SUSPENSION INTRA-ARTICULAR; INTRALESIONAL; INTRAMUSCULAR; SOFT TISSUE DAILY
Status: COMPLETED | OUTPATIENT
Start: 2023-06-28 | End: 2023-06-29

## 2023-06-28 RX ADMIN — BETAMETHASONE SODIUM PHOSPHATE AND BETAMETHASONE ACETATE 12 MG: 3; 3 INJECTION, SUSPENSION INTRA-ARTICULAR; INTRALESIONAL; INTRAMUSCULAR; SOFT TISSUE at 10:06

## 2023-06-28 NOTE — PROGRESS NOTES
T:  98.0      02%:  99    17 y.o. female  at 33w4d   She c/o increased cramping over the last week with noted at 5 times in 1 hr that is constant. Ketones noted in urine today  Reports good fetal movement or fluttering. Denies any vaginal bleeding, leakage of fluid, cramping, contractions, or pressure.   Total weight gain/weight loss in pregnancy: -0.839 kg (-1 lb 13.6 oz)   Thick white vaginal discharge noted- affirm obtained.    Vitals  BP: 106/61  Pulse: 90  Weight: 75.5 kg (166 lb 6.4 oz)  Prenatal  Fundal Height (cm): 34 cm  Fetal Heart Rate: 142  Movement: Present  Urine Albumin/Glucose  Urine Albumin: Trace  Urine Glucose: Negative  Edema  LLE Edema: None  RLE Edema: None  Facial: None  Additional Edema?: No  Cervical Exam  Dilation: 1  Effacement (%): 0  Station: -3  Presentation: Vertex  Station (Labor Curve): 8 cm  Dilation/Effacement/Station  Dilation: 1  Effacement (%): 0  Station: -3    Prenatal Labs:  Lab Results   Component Value Date    GROUPTRH B POS 2022    HGB 10.9 (L) 2023    HCT 33.4 (L) 2023     2023    SICKLE Negative 2022    HEPBSAG Non-Reactive 2022    [id number] Non-Reactive 2022    LABNGO Negative 2022    LABURIN Skin/Urogenital Samra Isolated, no further workup. 2022       A: 33w4d           ICD-10-CM ICD-9-CM    1. 33 weeks gestation of pregnancy  Z3A.33 V22.2 POCT URINALYSIS      2. Ketonuria  R82.4 791.6       3.  labor without delivery  O60.00 644.00 NIFEdipine (PROCARDIA) 10 MG Cap      betamethasone acetate-betamethasone sodium phosphate injection 12 mg      4. Vaginal discharge  N89.8 623.5 Bacterial Vaginosis      5. Weight loss  R63.4 783.21           P: Bleeding, daily fetal kick counts, and  labor/labor precautions discussed.    The following were addressed during this visit:    33-36 Weeks  - Childbirth Education/Hospital Tours   - Breastfeeding   - Group B Strep Test/HIV/RPR   - Fetal Kick  Counts/PIH/PTL precautions     No sex, pelvic rest, nothing in vagina, no nipple stimulation  Procardia 10 mg every 6 hrs and may stop taking medication @ 35 wks gestation  Celestone 12 mg IM today and repeat tomorrow    Questions answered to desired level of satisfaction  Verbalized understanding to all information and instructions provided.  Follow up in about 2 weeks (around 7/12/2023), or if symptoms worsen or fail to improve, for ESPERANZA in 2 weeks with me and repeat celestone tomorrow.    Helen Hoff, DNP, CNM, WHNP-BC

## 2023-06-29 ENCOUNTER — CLINICAL SUPPORT (OUTPATIENT)
Dept: OBSTETRICS AND GYNECOLOGY | Facility: CLINIC | Age: 18
End: 2023-06-29
Payer: MEDICAID

## 2023-06-29 PROCEDURE — 96372 PR INJECTION,THERAP/PROPH/DIAG2ST, IM OR SUBCUT: ICD-10-PCS | Mod: ,,, | Performed by: ADVANCED PRACTICE MIDWIFE

## 2023-06-29 PROCEDURE — 96372 THER/PROPH/DIAG INJ SC/IM: CPT | Mod: ,,, | Performed by: ADVANCED PRACTICE MIDWIFE

## 2023-06-29 RX ADMIN — BETAMETHASONE SODIUM PHOSPHATE AND BETAMETHASONE ACETATE 12 MG: 3; 3 INJECTION, SUSPENSION INTRA-ARTICULAR; INTRALESIONAL; INTRAMUSCULAR; SOFT TISSUE at 10:06

## 2023-07-17 ENCOUNTER — TELEPHONE (OUTPATIENT)
Dept: OBSTETRICS AND GYNECOLOGY | Facility: CLINIC | Age: 18
End: 2023-07-17
Payer: MEDICAID

## 2023-07-17 ENCOUNTER — ROUTINE PRENATAL (OUTPATIENT)
Dept: OBSTETRICS AND GYNECOLOGY | Facility: CLINIC | Age: 18
End: 2023-07-17
Payer: MEDICAID

## 2023-07-17 VITALS
HEART RATE: 103 BPM | SYSTOLIC BLOOD PRESSURE: 116 MMHG | DIASTOLIC BLOOD PRESSURE: 74 MMHG | WEIGHT: 167 LBS | BODY MASS INDEX: 30.54 KG/M2

## 2023-07-17 DIAGNOSIS — D64.9 ANEMIA, UNSPECIFIED TYPE: ICD-10-CM

## 2023-07-17 DIAGNOSIS — Z3A.35 35 WEEKS GESTATION OF PREGNANCY: Primary | ICD-10-CM

## 2023-07-17 DIAGNOSIS — B37.31 VAGINAL CANDIDA: ICD-10-CM

## 2023-07-17 DIAGNOSIS — B96.89 BV (BACTERIAL VAGINOSIS): ICD-10-CM

## 2023-07-17 DIAGNOSIS — Z11.3 SCREENING EXAMINATION FOR VENEREAL DISEASE: ICD-10-CM

## 2023-07-17 DIAGNOSIS — N76.0 BV (BACTERIAL VAGINOSIS): ICD-10-CM

## 2023-07-17 DIAGNOSIS — Z72.51 HIGH RISK SEXUAL BEHAVIOR, UNSPECIFIED TYPE: ICD-10-CM

## 2023-07-17 DIAGNOSIS — Z11.3 SCREEN FOR STD (SEXUALLY TRANSMITTED DISEASE): ICD-10-CM

## 2023-07-17 DIAGNOSIS — F12.90 MARIJUANA USE: ICD-10-CM

## 2023-07-17 DIAGNOSIS — Z36.85 ENCOUNTER FOR ANTENATAL SCREENING FOR STREPTOCOCCUS B: ICD-10-CM

## 2023-07-17 LAB
BASOPHILS # BLD AUTO: 0.04 K/UL (ref 0–0.2)
BASOPHILS NFR BLD AUTO: 0.5 % (ref 0–1)
BILIRUB SERPL-MCNC: NORMAL MG/DL
BLOOD URINE, POC: NORMAL
CANDIDA SPECIES: POSITIVE
COLOR, POC UA: YELLOW
CTP QC/QA: YES
DIFFERENTIAL METHOD BLD: ABNORMAL
EOSINOPHIL # BLD AUTO: 0.03 K/UL (ref 0–0.5)
EOSINOPHIL NFR BLD AUTO: 0.3 % (ref 1–4)
ERYTHROCYTE [DISTWIDTH] IN BLOOD BY AUTOMATED COUNT: 12.7 % (ref 11.5–14.5)
GARDNERELLA: POSITIVE
GLUCOSE UR QL STRIP: NORMAL
HCT VFR BLD AUTO: 32.9 % (ref 38–47)
HGB BLD-MCNC: 10.8 G/DL (ref 12–16)
IMM GRANULOCYTES # BLD AUTO: 0.03 K/UL (ref 0–0.04)
IMM GRANULOCYTES NFR BLD: 0.3 % (ref 0–0.4)
KETONES UR QL STRIP: NORMAL
LEUKOCYTE ESTERASE URINE, POC: NORMAL
LYMPHOCYTES # BLD AUTO: 2.99 K/UL (ref 1–4.8)
LYMPHOCYTES NFR BLD AUTO: 34.8 % (ref 27–41)
MCH RBC QN AUTO: 30.6 PG (ref 27–31)
MCHC RBC AUTO-ENTMCNC: 32.8 G/DL (ref 32–36)
MCV RBC AUTO: 93.2 FL (ref 80–96)
MONOCYTES # BLD AUTO: 0.82 K/UL (ref 0–0.8)
MONOCYTES NFR BLD AUTO: 9.5 % (ref 2–6)
MPC BLD CALC-MCNC: 10.7 FL (ref 9.4–12.4)
NEUTROPHILS # BLD AUTO: 4.68 K/UL (ref 1.8–7.7)
NEUTROPHILS NFR BLD AUTO: 54.6 % (ref 53–65)
NITRITE, POC UA: NORMAL
NRBC # BLD AUTO: 0 X10E3/UL
NRBC, AUTO (.00): 0 %
PH, POC UA: 6.5
PLATELET # BLD AUTO: 198 K/UL (ref 150–400)
POC (AMP) AMPHETAMINE: NEGATIVE
POC (BAR) BARBITURATES: NEGATIVE
POC (BUP) BUPRENORPHINE: NEGATIVE
POC (BZO) BENZODIAZEPINES: NEGATIVE
POC (COC) COCAINE: NEGATIVE
POC (MDMA) METHYLENEDIOXYMETHAMPHETAMINE 3,4: NEGATIVE
POC (MET) METHAMPHETAMINE: NEGATIVE
POC (MOP) OPIATES: NEGATIVE
POC (MTD) METHADONE: NEGATIVE
POC (OXY) OXYCODONE: NEGATIVE
POC (PCP) PHENCYCLIDINE: NEGATIVE
POC (TCA) TRICYCLIC ANTIDEPRESSANTS: NEGATIVE
POC TEMPERATURE (URINE): 94
POC THC: ABNORMAL
PROTEIN, POC: NORMAL
RBC # BLD AUTO: 3.53 M/UL (ref 4.2–5.4)
SPECIFIC GRAVITY, POC UA: 1.01
SYPHILIS AB INTERPRETATION: NORMAL
TRICHOMONAS: NEGATIVE
UROBILINOGEN, POC UA: 0.2
WBC # BLD AUTO: 8.59 K/UL (ref 4.5–11)

## 2023-07-17 PROCEDURE — 36415 COLL VENOUS BLD VENIPUNCTURE: CPT | Mod: ,,, | Performed by: ADVANCED PRACTICE MIDWIFE

## 2023-07-17 PROCEDURE — 87480 BACTERIAL VAGINOSIS: ICD-10-PCS | Mod: ,,, | Performed by: CLINICAL MEDICAL LABORATORY

## 2023-07-17 PROCEDURE — 86780 TREPONEMA PALLIDUM (SYPHILIS) ANTIBODY: ICD-10-PCS | Mod: ,,, | Performed by: CLINICAL MEDICAL LABORATORY

## 2023-07-17 PROCEDURE — 85025 COMPLETE CBC W/AUTO DIFF WBC: CPT | Mod: ,,, | Performed by: CLINICAL MEDICAL LABORATORY

## 2023-07-17 PROCEDURE — 87510 BACTERIAL VAGINOSIS: ICD-10-PCS | Mod: ,,, | Performed by: CLINICAL MEDICAL LABORATORY

## 2023-07-17 PROCEDURE — 59426 ANTEPARTUM CARE ONLY: CPT | Mod: TH,,, | Performed by: ADVANCED PRACTICE MIDWIFE

## 2023-07-17 PROCEDURE — 87653 STREP B DNA AMP PROBE: CPT | Mod: ,,, | Performed by: CLINICAL MEDICAL LABORATORY

## 2023-07-17 PROCEDURE — 59426 PR ANTEPARTUM CARE ONLY, >7 VISITS: ICD-10-PCS | Mod: TH,,, | Performed by: ADVANCED PRACTICE MIDWIFE

## 2023-07-17 PROCEDURE — 87480 CANDIDA DNA DIR PROBE: CPT | Mod: ,,, | Performed by: CLINICAL MEDICAL LABORATORY

## 2023-07-17 PROCEDURE — 85025 CBC WITH DIFFERENTIAL: ICD-10-PCS | Mod: ,,, | Performed by: CLINICAL MEDICAL LABORATORY

## 2023-07-17 PROCEDURE — 80305 DRUG TEST PRSMV DIR OPT OBS: CPT | Mod: QW,,, | Performed by: ADVANCED PRACTICE MIDWIFE

## 2023-07-17 PROCEDURE — 87660 TRICHOMONAS VAGIN DIR PROBE: CPT | Mod: ,,, | Performed by: CLINICAL MEDICAL LABORATORY

## 2023-07-17 PROCEDURE — 80305 POCT URINE DRUG SCREEN PRESUMP: ICD-10-PCS | Mod: QW,,, | Performed by: ADVANCED PRACTICE MIDWIFE

## 2023-07-17 PROCEDURE — 87660 BACTERIAL VAGINOSIS: ICD-10-PCS | Mod: ,,, | Performed by: CLINICAL MEDICAL LABORATORY

## 2023-07-17 PROCEDURE — 87653 STREP B SCREEN, ANTEPARTUM: ICD-10-PCS | Mod: ,,, | Performed by: CLINICAL MEDICAL LABORATORY

## 2023-07-17 PROCEDURE — 86780 TREPONEMA PALLIDUM: CPT | Mod: ,,, | Performed by: CLINICAL MEDICAL LABORATORY

## 2023-07-17 PROCEDURE — 36415 PR COLLECTION VENOUS BLOOD,VENIPUNCTURE: ICD-10-PCS | Mod: ,,, | Performed by: ADVANCED PRACTICE MIDWIFE

## 2023-07-17 PROCEDURE — 87510 GARDNER VAG DNA DIR PROBE: CPT | Mod: ,,, | Performed by: CLINICAL MEDICAL LABORATORY

## 2023-07-17 RX ORDER — TERCONAZOLE 4 MG/G
1 CREAM VAGINAL NIGHTLY
Qty: 45 G | Refills: 0 | Status: SHIPPED | OUTPATIENT
Start: 2023-07-17 | End: 2023-07-22

## 2023-07-17 RX ORDER — FERROUS SULFATE 325(65) MG
325 TABLET, DELAYED RELEASE (ENTERIC COATED) ORAL 2 TIMES DAILY
Qty: 30 TABLET | Refills: 4 | Status: SHIPPED | OUTPATIENT
Start: 2023-07-17 | End: 2023-11-16

## 2023-07-17 RX ORDER — METRONIDAZOLE 500 MG/1
500 TABLET ORAL 2 TIMES DAILY
Qty: 14 TABLET | Refills: 0 | Status: SHIPPED | OUTPATIENT
Start: 2023-07-17 | End: 2023-07-24

## 2023-07-17 NOTE — PROGRESS NOTES
17 y.o. female  at 36w2d   She c/o no problems.   Reports good fetal movement or fluttering. Denies any vaginal bleeding, leakage of fluid, cramping, contractions, or pressure.   SVE 1 cm ext/cl int/0/-3/vtx/posterior with thick white discharge noted  Labs obtained today  Perineal hygiene discussed  Total weight gain/weight loss in pregnancy: -0.567 kg (-1 lb 4 oz)     Vitals  BP: 116/74  Pulse: 103  Weight: 75.8 kg (167 lb)  Prenatal  Fundal Height (cm): 36 cm  Fetal Heart Rate: 140  Movement: Present  Urine Albumin/Glucose  Urine Albumin: Negative  Urine Glucose: Negative  Edema  LLE Edema: None  RLE Edema: None  Facial: None  Additional Edema?: No  Cervical Exam  Dilation: 1  Effacement (%): 0  Station: -3  Presentation: Vertex  Station (Labor Curve): 8 cm  Dilation/Effacement/Station  Dilation: 1  Effacement (%): 0  Station: -3    Prenatal Labs:  Lab Results   Component Value Date    GROUPTRH B POS 2022    HGB 10.9 (L) 2023    HCT 33.4 (L) 2023     2023    SICKLE Negative 2022    HEPBSAG Non-Reactive 2022    [id number] Non-Reactive 2022    LABNGO Negative 2022    LABURIN Skin/Urogenital Samra Isolated, no further workup. 2022       A: 36w2d           ICD-10-CM ICD-9-CM    1. 35 weeks gestation of pregnancy  Z3A.35 V22.2 Chlamydia/GC, PCR      CBC Auto Differential      POCT Urine Drug Screen Presump      POCT URINALYSIS W/O SCOPE      CBC Auto Differential      2. High risk sexual behavior, unspecified type  Z72.51 V69.2 Treponema Pallidum (Syphillis) Antibody      Treponema Pallidum (Syphillis) Antibody      3. Screen for STD (sexually transmitted disease)  Z11.3 V74.5 Chlamydia/GC, PCR      Treponema Pallidum (Syphillis) Antibody      Treponema Pallidum (Syphillis) Antibody      4. Encounter for  screening for Streptococcus B  Z36.85 V28.6 Strep B Screen, Antepartum      5. Marijuana use  F12.90 305.20           P: Bleeding, daily fetal  kick counts, and  labor/labor precautions discussed.    No pregnancy checklist tasks were completed during this visit, and no tasks are pending completion.    Marijuana in pregnancy can cross the placenta and can cause a decrease in the birth weight of the baby at birth. Marijuana use in pregnancy has been noted to increase the risk of stillbirth (death of the baby inside of the uterus), increase risk of  birth. Other adverse  outcomes for uses of marijuana in pregnancy can include hypoglycemia, sepsis (infection), anencephaly, esophageal atresia, diaphragmatic hernia, and and increased risk of gastroeshesis. Marijuana use in pregnancy can cause the offspring/child of the marijuana user to have an increased risk of hyperactivity, increased behavioral problems, poor performance on visual perception tasks, language comprehension and sustained attention and memory difficulties, decreased verbal reasoning, and increased impulsivity. Offspring of marijuana users were demonstrated to have scored lower in academic performance in reading and spelling.  Marijuana can increased the risk of nausea and vomiting.  Questions answered to desired level of satisfaction  Verbalized understanding to all information and instructions provided.  Follow up in about 1 week (around 2023), or if symptoms worsen or fail to improve, for NAZIA Hoff, DNP, CNM, WHNP-BC

## 2023-07-18 LAB — GROUP B STREP, PCR: POSITIVE

## 2023-07-19 LAB
CHLAMYDIA BY PCR: NORMAL
N. GONORRHOEAE (GC) BY PCR: NORMAL

## 2023-07-24 ENCOUNTER — ROUTINE PRENATAL (OUTPATIENT)
Dept: OBSTETRICS AND GYNECOLOGY | Facility: CLINIC | Age: 18
End: 2023-07-24
Payer: MEDICAID

## 2023-07-24 VITALS
BODY MASS INDEX: 31.64 KG/M2 | DIASTOLIC BLOOD PRESSURE: 69 MMHG | HEART RATE: 85 BPM | SYSTOLIC BLOOD PRESSURE: 109 MMHG | WEIGHT: 173 LBS

## 2023-07-24 DIAGNOSIS — Z3A.37 37 WEEKS GESTATION OF PREGNANCY: Primary | ICD-10-CM

## 2023-07-24 DIAGNOSIS — Z36.9 ENCOUNTER FOR ANTENATAL SCREENING: ICD-10-CM

## 2023-07-24 LAB
BILIRUB SERPL-MCNC: NORMAL MG/DL
BLOOD, POC UA: NORMAL
GLUCOSE UR QL STRIP: NORMAL
KETONES UR QL STRIP: 15
LEUKOCYTE ESTERASE URINE, POC: NORMAL
NITRITE, POC UA: NORMAL
PH, POC UA: 6.5
PROTEIN, POC: NORMAL
SPECIFIC GRAVITY, POC UA: 1.01
UROBILINOGEN, POC UA: 0.2

## 2023-07-24 PROCEDURE — 59426 PR ANTEPARTUM CARE ONLY, >7 VISITS: ICD-10-PCS | Mod: TH,,, | Performed by: ADVANCED PRACTICE MIDWIFE

## 2023-07-24 PROCEDURE — 59426 ANTEPARTUM CARE ONLY: CPT | Mod: TH,,, | Performed by: ADVANCED PRACTICE MIDWIFE

## 2023-07-24 NOTE — PROGRESS NOTES
T: 97.9  02: 99    17 y.o. female  at 37w2d   She c/o no problem  Reports good fetal movement or fluttering. Denies any vaginal bleeding, leakage of fluid, cramping, contractions, or pressure.   Total weight gain/weight loss in pregnancy: 2.154 kg (4 lb 12 oz)     Vitals  BP: 109/69  Pulse: 85  Weight: 78.5 kg (173 lb)  Prenatal  Fundal Height (cm): 37 cm  Fetal Heart Rate: 152  Movement: Present  Urine Albumin/Glucose  Urine Albumin: Negative  Urine Glucose: Negative  Edema  LLE Edema: None  RLE Edema: None  Facial: None  Additional Edema?: No    Prenatal Labs:  Lab Results   Component Value Date    GROUPTRH B POS 2022    HGB 10.8 (L) 2023    HCT 32.9 (L) 2023     2023    SICKLE Negative 2022    HEPBSAG Non-Reactive 2022    [id number] Non-Reactive 2022    LABNGO Invalid 2023    LABURIN Skin/Urogenital Samra Isolated, no further workup. 2022       A: 37w2d           ICD-10-CM ICD-9-CM    1. 37 weeks gestation of pregnancy  Z3A.37 V22.2 POCT URINALYSIS      US OB/GYN In Clinic Procedure (Non Viewpoint)-Future      2. Encounter for  screening  Z36.9 V28.9 US OB/GYN In Clinic Procedure (Non Viewpoint)-Future          P: Bleeding, daily fetal kick counts, and  labor/labor precautions discussed.    No pregnancy checklist tasks were completed during this visit, and no tasks are pending completion.      Questions answered to desired level of satisfaction  Verbalized understanding to all information and instructions provided.  Follow up in about 1 week (around 2023), or if symptoms worsen or fail to improve, for ESPERANZA visit, Ultrasound for weight, BPP, TOYA.    Helen Hoff, LUZ, CNM, WHNP-BC

## 2023-07-31 ENCOUNTER — ROUTINE PRENATAL (OUTPATIENT)
Dept: OBSTETRICS AND GYNECOLOGY | Facility: CLINIC | Age: 18
End: 2023-07-31
Payer: MEDICAID

## 2023-07-31 VITALS
HEART RATE: 87 BPM | WEIGHT: 177 LBS | SYSTOLIC BLOOD PRESSURE: 126 MMHG | DIASTOLIC BLOOD PRESSURE: 77 MMHG | BODY MASS INDEX: 32.37 KG/M2

## 2023-07-31 DIAGNOSIS — Z3A.38 38 WEEKS GESTATION OF PREGNANCY: Primary | ICD-10-CM

## 2023-07-31 PROCEDURE — 59426 ANTEPARTUM CARE ONLY: CPT | Mod: TH,,, | Performed by: ADVANCED PRACTICE MIDWIFE

## 2023-07-31 PROCEDURE — 59426 PR ANTEPARTUM CARE ONLY, >7 VISITS: ICD-10-PCS | Mod: TH,,, | Performed by: ADVANCED PRACTICE MIDWIFE

## 2023-07-31 NOTE — PROGRESS NOTES
T: 98.1  02: 99    17 y.o. female  at 38w2d   She c/o no problems today  Reports good fetal movement or fluttering. Denies any vaginal bleeding, leakage of fluid, cramping, contractions, or pressure.   Total weight gain/weight loss in pregnancy: 3.969 kg (8 lb 12 oz)     Vitals  BP: 126/77  Pulse: 87  Weight: 80.3 kg (177 lb)  Prenatal  Movement: Present  Urine Albumin/Glucose  Urine Albumin: Negative  Urine Glucose: Negative  Edema  LLE Edema: None  RLE Edema: None  Facial: None  Additional Edema?: No    Prenatal Labs:  Lab Results   Component Value Date    GROUPTRH B POS 2022    HGB 10.8 (L) 2023    HCT 32.9 (L) 2023     2023    SICKLE Negative 2022    HEPBSAG Non-Reactive 2022    [id number] Non-Reactive 2022    LABNGO Invalid 2023    LABURIN Skin/Urogenital Samra Isolated, no further workup. 2022       A: 38w2d           ICD-10-CM ICD-9-CM    1. 38 weeks gestation of pregnancy  Z3A.38 V22.2 POCT URINALYSIS          P: Bleeding, daily fetal kick counts, and  labor/labor precautions discussed.    No pregnancy checklist tasks were completed during this visit, and no tasks are pending completion.      Questions answered to desired level of satisfaction  Verbalized understanding to all information and instructions provided.  Follow up in about 1 week (around 2023), or if symptoms worsen or fail to improve, for ESPERANZA, Ultrasound in 1 week- CANCEL us FOR TOMORROW.    Helen Hoff, LUZ, CNM, WHNP-BC

## 2023-08-07 ENCOUNTER — HOSPITAL ENCOUNTER (OUTPATIENT)
Facility: HOSPITAL | Age: 18
Discharge: HOME | End: 2023-08-07
Attending: OBSTETRICS & GYNECOLOGY | Admitting: OBSTETRICS & GYNECOLOGY
Payer: MEDICAID

## 2023-08-07 ENCOUNTER — PROCEDURE VISIT (OUTPATIENT)
Dept: OBSTETRICS AND GYNECOLOGY | Facility: CLINIC | Age: 18
End: 2023-08-07
Payer: MEDICAID

## 2023-08-07 ENCOUNTER — ROUTINE PRENATAL (OUTPATIENT)
Dept: OBSTETRICS AND GYNECOLOGY | Facility: CLINIC | Age: 18
End: 2023-08-07
Payer: MEDICAID

## 2023-08-07 VITALS
RESPIRATION RATE: 18 BRPM | SYSTOLIC BLOOD PRESSURE: 124 MMHG | BODY MASS INDEX: 31.62 KG/M2 | HEART RATE: 86 BPM | HEIGHT: 62 IN | TEMPERATURE: 98 F | WEIGHT: 171.81 LBS | DIASTOLIC BLOOD PRESSURE: 66 MMHG

## 2023-08-07 VITALS
HEART RATE: 81 BPM | DIASTOLIC BLOOD PRESSURE: 72 MMHG | WEIGHT: 174.63 LBS | SYSTOLIC BLOOD PRESSURE: 114 MMHG | BODY MASS INDEX: 31.93 KG/M2

## 2023-08-07 DIAGNOSIS — O36.8130 DECREASED FETAL MOVEMENTS IN THIRD TRIMESTER, SINGLE OR UNSPECIFIED FETUS: ICD-10-CM

## 2023-08-07 DIAGNOSIS — Z3A.39 39 WEEKS GESTATION OF PREGNANCY: ICD-10-CM

## 2023-08-07 DIAGNOSIS — Z3A.39 39 WEEKS GESTATION OF PREGNANCY: Primary | ICD-10-CM

## 2023-08-07 DIAGNOSIS — Z36.9 ANTENATAL SCREENING ENCOUNTER: ICD-10-CM

## 2023-08-07 DIAGNOSIS — Z34.90 PREGNANCY: ICD-10-CM

## 2023-08-07 LAB
BILIRUB SERPL-MCNC: NORMAL MG/DL
BLOOD, POC UA: NORMAL
GLUCOSE UR QL STRIP: NORMAL
KETONES UR QL STRIP: NORMAL
LEUKOCYTE ESTERASE URINE, POC: NORMAL
NITRITE, POC UA: NORMAL
PH, POC UA: 7
PROTEIN, POC: NORMAL
SPECIFIC GRAVITY, POC UA: 1.01
UROBILINOGEN, POC UA: 0.2

## 2023-08-07 PROCEDURE — 59426 PR ANTEPARTUM CARE ONLY, >7 VISITS: ICD-10-PCS | Mod: TH,,, | Performed by: ADVANCED PRACTICE MIDWIFE

## 2023-08-07 PROCEDURE — 99499 UNLISTED E&M SERVICE: CPT | Mod: ,,, | Performed by: OBSTETRICS & GYNECOLOGY

## 2023-08-07 PROCEDURE — 76819 FETAL BIOPHYS PROFIL W/O NST: CPT | Mod: ,,, | Performed by: OBSTETRICS & GYNECOLOGY

## 2023-08-07 PROCEDURE — 99499 NO LOS: ICD-10-PCS | Mod: ,,, | Performed by: OBSTETRICS & GYNECOLOGY

## 2023-08-07 PROCEDURE — 99211 OFF/OP EST MAY X REQ PHY/QHP: CPT | Mod: 25

## 2023-08-07 PROCEDURE — 76819 PR US, OB, FETAL BIOPHYSICAL, W/O NST: ICD-10-PCS | Mod: ,,, | Performed by: OBSTETRICS & GYNECOLOGY

## 2023-08-07 PROCEDURE — 59426 ANTEPARTUM CARE ONLY: CPT | Mod: TH,,, | Performed by: ADVANCED PRACTICE MIDWIFE

## 2023-08-07 PROCEDURE — 76805 PR US, OB 14+WKS, TRANSABD, SINGLE GESTATION: ICD-10-PCS | Mod: ,,, | Performed by: OBSTETRICS & GYNECOLOGY

## 2023-08-07 PROCEDURE — 76805 OB US >/= 14 WKS SNGL FETUS: CPT | Mod: ,,, | Performed by: OBSTETRICS & GYNECOLOGY

## 2023-08-07 PROCEDURE — 59025 FETAL NON-STRESS TEST: CPT

## 2023-08-07 NOTE — PROGRESS NOTES
"17 y.o. female  at 39w2d   She c/o decreased fetal movement. States "baby moves some but not like he use to. Even after I eat he doesn't move as much". States has not completed meds for UTI and vaginal discharge from last week. Denies any vaginal bleeding, leakage of fluid, cramping, contractions, or pressure.   Total weight gain/weight loss in pregnancy: 2.88 kg (6 lb 5.6 oz)     Vitals  BP: 114/72  Pulse: 81  Weight: 79.2 kg (174 lb 9.6 oz)  Prenatal  Fundal Height (cm): 39 cm  Fetal Heart Rate: 131  Movement: Present  Urine Albumin/Glucose  Urine Albumin: Negative  Urine Glucose: Negative  Edema  LLE Edema: None  RLE Edema: None  Facial: None  Additional Edema?: No  VE- 1 cm/ thick/posterior/-3  Prenatal Labs:  Lab Results   Component Value Date    GROUPTRH B POS 2022    HGB 10.8 (L) 2023    HCT 32.9 (L) 2023     2023    SICKLE Negative 2022    HEPBSAG Non-Reactive 2022    [id number] Non-Reactive 2022    LABNGO Invalid 2023    LABURIN Skin/Urogenital Samra Isolated, no further workup. 2022       A: 39w2d           ICD-10-CM ICD-9-CM    1. 39 weeks gestation of pregnancy  Z3A.39 V22.2 POCT URINALYSIS      US OB 14+ Wks, TransAbd, Single Gestation      2.  screening encounter  Z36.9 V28.9 US OB 14+ Wks, TransAbd, Single Gestation      3. Decreased fetal movements in third trimester, single or unspecified fetus  O36.8130 655.73 US OB 14+ Wks, TransAbd, Single Gestation          P: Bleeding, daily fetal kick counts, and  labor/labor precautions discussed.    The following were addressed during this visit:    37-41 Weeks  - Postpartum Immunizations   - Hospital Stay Expectations   - When to go to the hospital   - Fetal kick counts/PIH/Bleeding/ROM/Labor precautions   - Labor induction policy   - Cervical ripening   - Breastfeeding review: benefits of breastfeeding, early skin to skin, 24/7 rooming in, exclusive breastfeeding for 6 months "   US at clinic today and to hospital for NST  Questions answered to desired level of satisfaction  Verbalized understanding to all information and instructions provided.  Follow up in about 1 week (around 8/14/2023), or if symptoms worsen or fail to improve, for ESPERANZA, Ultrasound.    Helen Hoff, DNP, CNM, WHNP-BC

## 2023-08-07 NOTE — PROGRESS NOTES
NON-STRESS TEST (NST) INTERPRETATION    Patient:  Shayy Mehta    Date:  2023    Gestational Age:  39w2d    NST Indication(s):   Decreased fetal movement    FINDINGS:    Baseline heart rate:  120s    NST Variability: Moderate  Category I    INTERPRETATION:   Reactive    Comments:  Patient is a 17-year-old  at 39 and 2/7 weeks gestational age who was seen in clinic by FELY Hoff CNM with complaint of decreased movement; a biophysical profile was performed in clinic that was ; patient was then sent to labor and delivery specifically for NST which is reactive.    Follow up:  Patient to follow-up with her OB as scheduled      Chidi Miramontes MD

## 2023-08-14 ENCOUNTER — ROUTINE PRENATAL (OUTPATIENT)
Dept: OBSTETRICS AND GYNECOLOGY | Facility: CLINIC | Age: 18
End: 2023-08-14
Payer: MEDICAID

## 2023-08-14 ENCOUNTER — PROCEDURE VISIT (OUTPATIENT)
Dept: OBSTETRICS AND GYNECOLOGY | Facility: CLINIC | Age: 18
End: 2023-08-14
Payer: MEDICAID

## 2023-08-14 VITALS
DIASTOLIC BLOOD PRESSURE: 72 MMHG | HEART RATE: 91 BPM | WEIGHT: 182.19 LBS | SYSTOLIC BLOOD PRESSURE: 111 MMHG | BODY MASS INDEX: 33.32 KG/M2

## 2023-08-14 DIAGNOSIS — B95.1 GROUP BETA STREP POSITIVE: ICD-10-CM

## 2023-08-14 DIAGNOSIS — Z3A.40 40 WEEKS GESTATION OF PREGNANCY: Primary | ICD-10-CM

## 2023-08-14 LAB
BILIRUB SERPL-MCNC: NORMAL MG/DL
BLOOD, POC UA: NORMAL
GLUCOSE UR QL STRIP: NORMAL
KETONES UR QL STRIP: NORMAL
LEUKOCYTE ESTERASE URINE, POC: NORMAL
NITRITE, POC UA: NORMAL
PH, POC UA: 6
PROTEIN, POC: NORMAL
SPECIFIC GRAVITY, POC UA: <=1.005
UROBILINOGEN, POC UA: 0.2

## 2023-08-14 PROCEDURE — 76819 FETAL BIOPHYS PROFIL W/O NST: CPT | Mod: ,,, | Performed by: OBSTETRICS & GYNECOLOGY

## 2023-08-14 PROCEDURE — 76805 PR US, OB 14+WKS, TRANSABD, SINGLE GESTATION: ICD-10-PCS | Mod: ,,, | Performed by: OBSTETRICS & GYNECOLOGY

## 2023-08-14 PROCEDURE — 76819 PR US, OB, FETAL BIOPHYSICAL, W/O NST: ICD-10-PCS | Mod: ,,, | Performed by: OBSTETRICS & GYNECOLOGY

## 2023-08-14 PROCEDURE — 59426 PR ANTEPARTUM CARE ONLY, >7 VISITS: ICD-10-PCS | Mod: TH,,, | Performed by: ADVANCED PRACTICE MIDWIFE

## 2023-08-14 PROCEDURE — 76805 OB US >/= 14 WKS SNGL FETUS: CPT | Mod: ,,, | Performed by: OBSTETRICS & GYNECOLOGY

## 2023-08-14 PROCEDURE — 99499 NO LOS: ICD-10-PCS | Mod: ,,, | Performed by: OBSTETRICS & GYNECOLOGY

## 2023-08-14 PROCEDURE — 99499 UNLISTED E&M SERVICE: CPT | Mod: ,,, | Performed by: OBSTETRICS & GYNECOLOGY

## 2023-08-14 PROCEDURE — 59426 ANTEPARTUM CARE ONLY: CPT | Mod: TH,,, | Performed by: ADVANCED PRACTICE MIDWIFE

## 2023-08-14 NOTE — PROGRESS NOTES
"T:      02%:    17 y.o. female  at 40w2d   She c/o "bleeding this morning like my mucous plug was coming out". States has had intercourse recently. After lengthy discussion pt states she has had some discomfort on the left lower abdominal area and denies any contractions. Does not desire to be induced. States "I'll just wait". F/U next week for repeat US and discussed being induced if not delivered- verbalized understanding.  Reports good fetal movement or fluttering. Denies any vaginal bleeding, leakage of fluid, cramping, contractions, or pressure.   Total weight gain/weight loss in pregnancy: 6.327 kg (13 lb 15.2 oz)     Vitals  BP: 111/72  Pulse: 91  Weight: 82.6 kg (182 lb 3.2 oz)  Urine Albumin/Glucose  Urine Glucose: Negative  Edema  LLE Edema: None  RLE Edema: None  Facial: None  Additional Edema?: No  Cervical Exam  Dilation: 3  Effacement (%): 50  Station: -3  Presentation: Vertex  Station (Labor Curve): 8 cm  Dilation/Effacement/Station  Dilation: 3  Effacement (%): 50  Station: -3    Prenatal Labs:  Lab Results   Component Value Date    GROUPTRH B POS 2022    HGB 10.8 (L) 2023    HCT 32.9 (L) 2023     2023    SICKLE Negative 2022    HEPBSAG Non-Reactive 2022    [id number] Non-Reactive 2022    LABNGO Invalid 2023    LABURIN Skin/Urogenital Samra Isolated, no further workup. 2022       A: 40w2d           ICD-10-CM ICD-9-CM    1. 40 weeks gestation of pregnancy  Z3A.40 V22.2 POCT Urinalysis      2. Group beta Strep positive  B95.1 041.02           P: Bleeding, daily fetal kick counts, and  labor/labor precautions discussed.    No pregnancy checklist tasks were completed during this visit, and no tasks are pending completion.  Questions answered to desired level of satisfaction  Verbalized understanding to all information and instructions provided.  Follow up in about 1 week (around 2023), or if symptoms worsen or fail to improve, " for ESPERANZA visit, Ultrasound.    Helen Hoff, DNP, CNM, WHNP-BC

## 2023-08-15 ENCOUNTER — HOSPITAL ENCOUNTER (INPATIENT)
Facility: HOSPITAL | Age: 18
LOS: 2 days | Discharge: HOME | End: 2023-08-17
Attending: OBSTETRICS & GYNECOLOGY | Admitting: OBSTETRICS & GYNECOLOGY
Payer: MEDICAID

## 2023-08-15 ENCOUNTER — ANESTHESIA EVENT (OUTPATIENT)
Dept: OBSTETRICS AND GYNECOLOGY | Facility: HOSPITAL | Age: 18
End: 2023-08-15
Payer: MEDICAID

## 2023-08-15 ENCOUNTER — ANESTHESIA (OUTPATIENT)
Dept: OBSTETRICS AND GYNECOLOGY | Facility: HOSPITAL | Age: 18
End: 2023-08-15
Payer: MEDICAID

## 2023-08-15 DIAGNOSIS — O42.90 DELAYED DELIVERY AFTER SROM (SPONTANEOUS RUPTURE OF MEMBRANES): ICD-10-CM

## 2023-08-15 DIAGNOSIS — Z34.90 PREGNANCY: ICD-10-CM

## 2023-08-15 DIAGNOSIS — Z98.891 STATUS POST CESAREAN DELIVERY: Primary | ICD-10-CM

## 2023-08-15 PROBLEM — Z3A.40 40 WEEKS GESTATION OF PREGNANCY: Status: ACTIVE | Noted: 2023-08-15

## 2023-08-15 PROBLEM — B95.1 POSITIVE GBS TEST: Status: ACTIVE | Noted: 2023-08-15

## 2023-08-15 LAB
ALBUMIN SERPL BCP-MCNC: 2.5 G/DL (ref 3.5–5)
ALBUMIN SERPL BCP-MCNC: 2.7 G/DL (ref 3.5–5)
ALBUMIN/GLOB SERPL: 0.6 {RATIO}
ALBUMIN/GLOB SERPL: 0.8 {RATIO}
ALP SERPL-CCNC: 205 U/L (ref 52–144)
ALP SERPL-CCNC: 210 U/L (ref 52–144)
ALT SERPL W P-5'-P-CCNC: 19 U/L (ref 13–56)
ALT SERPL W P-5'-P-CCNC: 21 U/L (ref 13–56)
ANION GAP SERPL CALCULATED.3IONS-SCNC: 14 MMOL/L (ref 7–16)
ANION GAP SERPL CALCULATED.3IONS-SCNC: 14 MMOL/L (ref 7–16)
AST SERPL W P-5'-P-CCNC: 29 U/L (ref 15–37)
AST SERPL W P-5'-P-CCNC: 29 U/L (ref 15–37)
BACTERIA #/AREA URNS HPF: ABNORMAL /HPF
BASOPHILS # BLD AUTO: 0.03 K/UL (ref 0–0.2)
BASOPHILS # BLD AUTO: 0.04 K/UL (ref 0–0.2)
BASOPHILS NFR BLD AUTO: 0.2 % (ref 0–1)
BASOPHILS NFR BLD AUTO: 0.4 % (ref 0–1)
BILIRUB SERPL-MCNC: 0.5 MG/DL (ref ?–1.2)
BILIRUB SERPL-MCNC: 0.7 MG/DL (ref ?–1.2)
BILIRUB UR QL STRIP: NEGATIVE
BUN SERPL-MCNC: 5 MG/DL (ref 7–18)
BUN SERPL-MCNC: 5 MG/DL (ref 7–18)
BUN/CREAT SERPL: 6 (ref 6–20)
BUN/CREAT SERPL: 7 (ref 6–20)
CALCIUM SERPL-MCNC: 9 MG/DL (ref 8.5–10.1)
CALCIUM SERPL-MCNC: 9.1 MG/DL (ref 8.5–10.1)
CHLORIDE SERPL-SCNC: 111 MMOL/L (ref 98–107)
CHLORIDE SERPL-SCNC: 113 MMOL/L (ref 98–107)
CLARITY UR: CLEAR
CO2 SERPL-SCNC: 18 MMOL/L (ref 21–32)
CO2 SERPL-SCNC: 18 MMOL/L (ref 21–32)
COLOR UR: COLORLESS
CREAT SERPL-MCNC: 0.71 MG/DL (ref 0.55–1.02)
CREAT SERPL-MCNC: 0.78 MG/DL (ref 0.55–1.02)
DIFFERENTIAL METHOD BLD: ABNORMAL
DIFFERENTIAL METHOD BLD: ABNORMAL
EGFR (NO RACE VARIABLE) (RUSH/TITUS): 112 ML/MIN/1.73M2
EGFR (NO RACE VARIABLE) (RUSH/TITUS): 126 ML/MIN/1.73M2
EOSINOPHIL # BLD AUTO: 0 K/UL (ref 0–0.5)
EOSINOPHIL # BLD AUTO: 0.03 K/UL (ref 0–0.5)
EOSINOPHIL NFR BLD AUTO: 0 % (ref 1–4)
EOSINOPHIL NFR BLD AUTO: 0.3 % (ref 1–4)
ERYTHROCYTE [DISTWIDTH] IN BLOOD BY AUTOMATED COUNT: 13.4 % (ref 11.5–14.5)
ERYTHROCYTE [DISTWIDTH] IN BLOOD BY AUTOMATED COUNT: 13.4 % (ref 11.5–14.5)
GLOBULIN SER-MCNC: 3.6 G/DL (ref 2–4)
GLOBULIN SER-MCNC: 4 G/DL (ref 2–4)
GLUCOSE SERPL-MCNC: 70 MG/DL (ref 74–106)
GLUCOSE SERPL-MCNC: 72 MG/DL (ref 74–106)
GLUCOSE UR STRIP-MCNC: NORMAL MG/DL
HBV SURFACE AG SERPL QL IA: NORMAL
HCT VFR BLD AUTO: 34.5 % (ref 38–47)
HCT VFR BLD AUTO: 36.4 % (ref 38–47)
HGB BLD-MCNC: 11.8 G/DL (ref 12–16)
HGB BLD-MCNC: 12.4 G/DL (ref 12–16)
HIV 1+O+2 AB SERPL QL: NORMAL
IMM GRANULOCYTES # BLD AUTO: 0.05 K/UL (ref 0–0.04)
IMM GRANULOCYTES # BLD AUTO: 0.09 K/UL (ref 0–0.04)
IMM GRANULOCYTES NFR BLD: 0.5 % (ref 0–0.4)
IMM GRANULOCYTES NFR BLD: 0.7 % (ref 0–0.4)
INDIRECT COOMBS: NORMAL
KETONES UR STRIP-SCNC: 40 MG/DL
LEUKOCYTE ESTERASE UR QL STRIP: ABNORMAL
LYMPHOCYTES # BLD AUTO: 1.3 K/UL (ref 1–4.8)
LYMPHOCYTES # BLD AUTO: 3.04 K/UL (ref 1–4.8)
LYMPHOCYTES NFR BLD AUTO: 10.4 % (ref 27–41)
LYMPHOCYTES NFR BLD AUTO: 29.7 % (ref 27–41)
MCH RBC QN AUTO: 30.7 PG (ref 27–31)
MCH RBC QN AUTO: 31 PG (ref 27–31)
MCHC RBC AUTO-ENTMCNC: 34.1 G/DL (ref 32–36)
MCHC RBC AUTO-ENTMCNC: 34.2 G/DL (ref 32–36)
MCV RBC AUTO: 89.8 FL (ref 80–96)
MCV RBC AUTO: 91 FL (ref 80–96)
MONOCYTES # BLD AUTO: 0.87 K/UL (ref 0–0.8)
MONOCYTES # BLD AUTO: 1.18 K/UL (ref 0–0.8)
MONOCYTES NFR BLD AUTO: 8.5 % (ref 2–6)
MONOCYTES NFR BLD AUTO: 9.5 % (ref 2–6)
MPC BLD CALC-MCNC: 11 FL (ref 9.4–12.4)
MPC BLD CALC-MCNC: 11.1 FL (ref 9.4–12.4)
NEUTROPHILS # BLD AUTO: 6.2 K/UL (ref 1.8–7.7)
NEUTROPHILS # BLD AUTO: 9.88 K/UL (ref 1.8–7.7)
NEUTROPHILS NFR BLD AUTO: 60.6 % (ref 53–65)
NEUTROPHILS NFR BLD AUTO: 79.2 % (ref 53–65)
NITRITE UR QL STRIP: NEGATIVE
NRBC # BLD AUTO: 0 X10E3/UL
NRBC # BLD AUTO: 0 X10E3/UL
NRBC, AUTO (.00): 0 %
NRBC, AUTO (.00): 0 %
PH UR STRIP: 6.5 PH UNITS
PLATELET # BLD AUTO: 199 K/UL (ref 150–400)
PLATELET # BLD AUTO: 204 K/UL (ref 150–400)
POTASSIUM SERPL-SCNC: 3.6 MMOL/L (ref 3.5–5.1)
POTASSIUM SERPL-SCNC: 3.6 MMOL/L (ref 3.5–5.1)
PROT SERPL-MCNC: 6.3 G/DL (ref 6.4–8.2)
PROT SERPL-MCNC: 6.5 G/DL (ref 6.4–8.2)
PROT UR QL STRIP: NEGATIVE
RBC # BLD AUTO: 3.84 M/UL (ref 4.2–5.4)
RBC # BLD AUTO: 4 M/UL (ref 4.2–5.4)
RBC # UR STRIP: NEGATIVE /UL
RBC #/AREA URNS HPF: 2 /HPF
RH BLD: NORMAL
SODIUM SERPL-SCNC: 139 MMOL/L (ref 136–145)
SODIUM SERPL-SCNC: 141 MMOL/L (ref 136–145)
SP GR UR STRIP: 1
SPECIMEN OUTDATE: NORMAL
SQUAMOUS #/AREA URNS LPF: ABNORMAL /HPF
SYPHILIS AB INTERPRETATION: NORMAL
URATE SERPL-MCNC: 6 MG/DL (ref 2.6–6)
UROBILINOGEN UR STRIP-ACNC: NORMAL MG/DL
WBC # BLD AUTO: 10.23 K/UL (ref 4.5–11)
WBC # BLD AUTO: 12.48 K/UL (ref 4.5–11)
WBC #/AREA URNS HPF: 3 /HPF

## 2023-08-15 PROCEDURE — 59410 PRA OBSTE CARE,VAG DELIV+POSTPARTUM: ICD-10-PCS | Mod: QX,CRNA,TH,

## 2023-08-15 PROCEDURE — 63600175 PHARM REV CODE 636 W HCPCS: Performed by: OBSTETRICS & GYNECOLOGY

## 2023-08-15 PROCEDURE — 27000744 HC TRAY, FOLEY CATH W/BAG

## 2023-08-15 PROCEDURE — 59515 CESAREAN DELIVERY: CPT | Mod: TH,,, | Performed by: OBSTETRICS & GYNECOLOGY

## 2023-08-15 PROCEDURE — 71000039 HC RECOVERY, EACH ADD'L HOUR: Performed by: OBSTETRICS & GYNECOLOGY

## 2023-08-15 PROCEDURE — 63600175 PHARM REV CODE 636 W HCPCS: Performed by: ADVANCED PRACTICE MIDWIFE

## 2023-08-15 PROCEDURE — 37000009 HC ANESTHESIA EA ADD 15 MINS: Performed by: OBSTETRICS & GYNECOLOGY

## 2023-08-15 PROCEDURE — 85025 COMPLETE CBC W/AUTO DIFF WBC: CPT | Performed by: OBSTETRICS & GYNECOLOGY

## 2023-08-15 PROCEDURE — 86900 BLOOD TYPING SEROLOGIC ABO: CPT | Performed by: OBSTETRICS & GYNECOLOGY

## 2023-08-15 PROCEDURE — 59410 PRA OBSTE CARE,VAG DELIV+POSTPARTUM: ICD-10-PCS | Mod: AA,ANES,TH, | Performed by: ANESTHESIOLOGY

## 2023-08-15 PROCEDURE — 27201423 OPTIME MED/SURG SUP & DEVICES STERILE SUPPLY: Performed by: OBSTETRICS & GYNECOLOGY

## 2023-08-15 PROCEDURE — 01968 PR INSERT CATH,ART,PERCUT,SHORTTERM: ICD-10-PCS | Mod: QX,ANES,TH, | Performed by: ANESTHESIOLOGY

## 2023-08-15 PROCEDURE — C1765 ADHESION BARRIER: HCPCS | Performed by: OBSTETRICS & GYNECOLOGY

## 2023-08-15 PROCEDURE — 01968 ANES/ANALG CS DLVR NEURAXIAL: CPT | Mod: QX,CRNA,TH,

## 2023-08-15 PROCEDURE — 59410 OBSTETRICAL CARE: CPT | Mod: QX,CRNA,TH,

## 2023-08-15 PROCEDURE — 87389 HIV-1 AG W/HIV-1&-2 AB AG IA: CPT | Performed by: OBSTETRICS & GYNECOLOGY

## 2023-08-15 PROCEDURE — 37000008 HC ANESTHESIA 1ST 15 MINUTES: Performed by: OBSTETRICS & GYNECOLOGY

## 2023-08-15 PROCEDURE — 59515 PR CESAREAN DELIVERY+POSTPARTUM CARE: ICD-10-PCS | Mod: TH,,, | Performed by: OBSTETRICS & GYNECOLOGY

## 2023-08-15 PROCEDURE — 25000003 PHARM REV CODE 250: Performed by: OBSTETRICS & GYNECOLOGY

## 2023-08-15 PROCEDURE — 62326 NJX INTERLAMINAR LMBR/SAC: CPT | Mod: AA | Performed by: ANESTHESIOLOGY

## 2023-08-15 PROCEDURE — 01968 PR INSERT CATH,ART,PERCUT,SHORTTERM: ICD-10-PCS | Mod: QX,CRNA,TH,

## 2023-08-15 PROCEDURE — 63600175 PHARM REV CODE 636 W HCPCS

## 2023-08-15 PROCEDURE — 87340 HEPATITIS B SURFACE AG IA: CPT | Performed by: OBSTETRICS & GYNECOLOGY

## 2023-08-15 PROCEDURE — 59410 OBSTETRICAL CARE: CPT | Mod: AA,ANES,TH, | Performed by: ANESTHESIOLOGY

## 2023-08-15 PROCEDURE — 11000001 HC ACUTE MED/SURG PRIVATE ROOM

## 2023-08-15 PROCEDURE — 36004724 HC OB OR TIME LEV III - 1ST 15 MIN: Performed by: OBSTETRICS & GYNECOLOGY

## 2023-08-15 PROCEDURE — 84550 ASSAY OF BLOOD/URIC ACID: CPT | Performed by: OBSTETRICS & GYNECOLOGY

## 2023-08-15 PROCEDURE — 80053 COMPREHEN METABOLIC PANEL: CPT | Performed by: OBSTETRICS & GYNECOLOGY

## 2023-08-15 PROCEDURE — 94640 AIRWAY INHALATION TREATMENT: CPT

## 2023-08-15 PROCEDURE — 25000003 PHARM REV CODE 250: Performed by: ANESTHESIOLOGY

## 2023-08-15 PROCEDURE — 36004725 HC OB OR TIME LEV III - EA ADD 15 MIN: Performed by: OBSTETRICS & GYNECOLOGY

## 2023-08-15 PROCEDURE — 94761 N-INVAS EAR/PLS OXIMETRY MLT: CPT

## 2023-08-15 PROCEDURE — 25000003 PHARM REV CODE 250: Performed by: ADVANCED PRACTICE MIDWIFE

## 2023-08-15 PROCEDURE — 01968 ANES/ANALG CS DLVR NEURAXIAL: CPT | Mod: QX,ANES,TH, | Performed by: ANESTHESIOLOGY

## 2023-08-15 PROCEDURE — 86780 TREPONEMA PALLIDUM: CPT | Performed by: OBSTETRICS & GYNECOLOGY

## 2023-08-15 PROCEDURE — 51702 INSERT TEMP BLADDER CATH: CPT

## 2023-08-15 PROCEDURE — 71000033 HC RECOVERY, INTIAL HOUR: Performed by: OBSTETRICS & GYNECOLOGY

## 2023-08-15 PROCEDURE — 99211 OFF/OP EST MAY X REQ PHY/QHP: CPT

## 2023-08-15 PROCEDURE — 25000242 PHARM REV CODE 250 ALT 637 W/ HCPCS: Performed by: ANESTHESIOLOGY

## 2023-08-15 PROCEDURE — 81001 URINALYSIS AUTO W/SCOPE: CPT | Performed by: OBSTETRICS & GYNECOLOGY

## 2023-08-15 PROCEDURE — C1751 CATH, INF, PER/CENT/MIDLINE: HCPCS | Performed by: ANESTHESIOLOGY

## 2023-08-15 PROCEDURE — 63600175 PHARM REV CODE 636 W HCPCS: Performed by: ANESTHESIOLOGY

## 2023-08-15 DEVICE — BARRIER INTERCEED 3X4 ST DIS: Type: IMPLANTABLE DEVICE | Site: ABDOMEN | Status: FUNCTIONAL

## 2023-08-15 RX ORDER — SODIUM CHLORIDE, SODIUM LACTATE, POTASSIUM CHLORIDE, CALCIUM CHLORIDE 600; 310; 30; 20 MG/100ML; MG/100ML; MG/100ML; MG/100ML
INJECTION, SOLUTION INTRAVENOUS CONTINUOUS
Status: DISCONTINUED | OUTPATIENT
Start: 2023-08-15 | End: 2023-08-17 | Stop reason: HOSPADM

## 2023-08-15 RX ORDER — OXYCODONE HYDROCHLORIDE 5 MG/1
5 TABLET ORAL EVERY 4 HOURS PRN
Status: DISCONTINUED | OUTPATIENT
Start: 2023-08-15 | End: 2023-08-16

## 2023-08-15 RX ORDER — OXYTOCIN/RINGER'S LACTATE 30/500 ML
334 PLASTIC BAG, INJECTION (ML) INTRAVENOUS ONCE
Status: DISCONTINUED | OUTPATIENT
Start: 2023-08-15 | End: 2023-08-17 | Stop reason: HOSPADM

## 2023-08-15 RX ORDER — EPHEDRINE SULFATE 50 MG/ML
10 INJECTION, SOLUTION INTRAVENOUS ONCE
Status: DISCONTINUED | OUTPATIENT
Start: 2023-08-15 | End: 2023-08-15

## 2023-08-15 RX ORDER — KETOROLAC TROMETHAMINE 30 MG/ML
INJECTION, SOLUTION INTRAMUSCULAR; INTRAVENOUS
Status: DISCONTINUED | OUTPATIENT
Start: 2023-08-15 | End: 2023-08-15

## 2023-08-15 RX ORDER — PHENYLEPHRINE HYDROCHLORIDE 10 MG/ML
INJECTION INTRAVENOUS
Status: DISCONTINUED | OUTPATIENT
Start: 2023-08-15 | End: 2023-08-15

## 2023-08-15 RX ORDER — ALBUTEROL SULFATE 0.83 MG/ML
2.5 SOLUTION RESPIRATORY (INHALATION) EVERY 4 HOURS PRN
Status: DISCONTINUED | OUTPATIENT
Start: 2023-08-15 | End: 2023-08-17 | Stop reason: HOSPADM

## 2023-08-15 RX ORDER — MISOPROSTOL 200 UG/1
800 TABLET ORAL ONCE AS NEEDED
Status: DISCONTINUED | OUTPATIENT
Start: 2023-08-15 | End: 2023-08-17 | Stop reason: HOSPADM

## 2023-08-15 RX ORDER — ACETAMINOPHEN 325 MG/1
650 TABLET ORAL EVERY 6 HOURS
Status: DISCONTINUED | OUTPATIENT
Start: 2023-08-16 | End: 2023-08-16

## 2023-08-15 RX ORDER — TRANEXAMIC ACID 10 MG/ML
1000 INJECTION, SOLUTION INTRAVENOUS ONCE AS NEEDED
Status: DISCONTINUED | OUTPATIENT
Start: 2023-08-15 | End: 2023-08-17 | Stop reason: HOSPADM

## 2023-08-15 RX ORDER — ONDANSETRON 2 MG/ML
4 INJECTION INTRAMUSCULAR; INTRAVENOUS EVERY 6 HOURS PRN
Status: DISCONTINUED | OUTPATIENT
Start: 2023-08-15 | End: 2023-08-17 | Stop reason: HOSPADM

## 2023-08-15 RX ORDER — PRENATAL WITH FERROUS FUM AND FOLIC ACID 3080; 920; 120; 400; 22; 1.84; 3; 20; 10; 1; 12; 200; 27; 25; 2 [IU]/1; [IU]/1; MG/1; [IU]/1; MG/1; MG/1; MG/1; MG/1; MG/1; MG/1; UG/1; MG/1; MG/1; MG/1; MG/1
1 TABLET ORAL DAILY
Status: DISCONTINUED | OUTPATIENT
Start: 2023-08-16 | End: 2023-08-17 | Stop reason: HOSPADM

## 2023-08-15 RX ORDER — DIPHENHYDRAMINE HCL 25 MG
25 CAPSULE ORAL EVERY 4 HOURS PRN
Status: DISCONTINUED | OUTPATIENT
Start: 2023-08-15 | End: 2023-08-17 | Stop reason: HOSPADM

## 2023-08-15 RX ORDER — MUPIROCIN 20 MG/G
OINTMENT TOPICAL
Status: DISCONTINUED | OUTPATIENT
Start: 2023-08-15 | End: 2023-08-17 | Stop reason: HOSPADM

## 2023-08-15 RX ORDER — METHYLERGONOVINE MALEATE 0.2 MG/ML
200 INJECTION INTRAVENOUS
Status: DISCONTINUED | OUTPATIENT
Start: 2023-08-15 | End: 2023-08-17 | Stop reason: HOSPADM

## 2023-08-15 RX ORDER — CARBOPROST TROMETHAMINE 250 UG/ML
250 INJECTION, SOLUTION INTRAMUSCULAR
Status: DISCONTINUED | OUTPATIENT
Start: 2023-08-15 | End: 2023-08-17 | Stop reason: HOSPADM

## 2023-08-15 RX ORDER — MAGNESIUM SULFATE HEPTAHYDRATE 40 MG/ML
2 INJECTION, SOLUTION INTRAVENOUS CONTINUOUS
Status: DISCONTINUED | OUTPATIENT
Start: 2023-08-15 | End: 2023-08-17 | Stop reason: HOSPADM

## 2023-08-15 RX ORDER — OXYTOCIN/RINGER'S LACTATE 30/500 ML
95 PLASTIC BAG, INJECTION (ML) INTRAVENOUS ONCE
Status: DISCONTINUED | OUTPATIENT
Start: 2023-08-15 | End: 2023-08-17 | Stop reason: HOSPADM

## 2023-08-15 RX ORDER — LIDOCAINE HYDROCHLORIDE 20 MG/ML
INJECTION, SOLUTION EPIDURAL; INFILTRATION; INTRACAUDAL; PERINEURAL
Status: COMPLETED | OUTPATIENT
Start: 2023-08-15 | End: 2023-08-15

## 2023-08-15 RX ORDER — OXYTOCIN 10 [USP'U]/ML
10 INJECTION, SOLUTION INTRAMUSCULAR; INTRAVENOUS ONCE AS NEEDED
Status: DISCONTINUED | OUTPATIENT
Start: 2023-08-15 | End: 2023-08-17 | Stop reason: HOSPADM

## 2023-08-15 RX ORDER — HYDRALAZINE HYDROCHLORIDE 20 MG/ML
10 INJECTION INTRAMUSCULAR; INTRAVENOUS ONCE AS NEEDED
Status: DISCONTINUED | OUTPATIENT
Start: 2023-08-15 | End: 2023-08-17 | Stop reason: HOSPADM

## 2023-08-15 RX ORDER — DIPHENOXYLATE HYDROCHLORIDE AND ATROPINE SULFATE 2.5; .025 MG/1; MG/1
2 TABLET ORAL EVERY 6 HOURS PRN
Status: DISCONTINUED | OUTPATIENT
Start: 2023-08-15 | End: 2023-08-17 | Stop reason: HOSPADM

## 2023-08-15 RX ORDER — ALBUTEROL SULFATE 0.83 MG/ML
2.5 SOLUTION RESPIRATORY (INHALATION) EVERY 4 HOURS PRN
Status: DISCONTINUED | OUTPATIENT
Start: 2023-08-15 | End: 2023-08-15

## 2023-08-15 RX ORDER — MISOPROSTOL 200 UG/1
800 TABLET ORAL
Status: DISCONTINUED | OUTPATIENT
Start: 2023-08-15 | End: 2023-08-17 | Stop reason: HOSPADM

## 2023-08-15 RX ORDER — ONDANSETRON 2 MG/ML
4 INJECTION INTRAMUSCULAR; INTRAVENOUS EVERY 6 HOURS PRN
Status: ACTIVE | OUTPATIENT
Start: 2023-08-15 | End: 2023-08-16

## 2023-08-15 RX ORDER — ONDANSETRON 4 MG/1
8 TABLET, ORALLY DISINTEGRATING ORAL EVERY 8 HOURS PRN
Status: DISCONTINUED | OUTPATIENT
Start: 2023-08-15 | End: 2023-08-17 | Stop reason: HOSPADM

## 2023-08-15 RX ORDER — ADHESIVE BANDAGE
30 BANDAGE TOPICAL 2 TIMES DAILY PRN
Status: DISCONTINUED | OUTPATIENT
Start: 2023-08-16 | End: 2023-08-17 | Stop reason: HOSPADM

## 2023-08-15 RX ORDER — SIMETHICONE 80 MG
1 TABLET,CHEWABLE ORAL 4 TIMES DAILY PRN
Status: DISCONTINUED | OUTPATIENT
Start: 2023-08-15 | End: 2023-08-17 | Stop reason: HOSPADM

## 2023-08-15 RX ORDER — DOCUSATE SODIUM 100 MG/1
200 CAPSULE, LIQUID FILLED ORAL 2 TIMES DAILY
Status: DISCONTINUED | OUTPATIENT
Start: 2023-08-15 | End: 2023-08-17 | Stop reason: HOSPADM

## 2023-08-15 RX ORDER — MAGNESIUM SULFATE HEPTAHYDRATE 40 MG/ML
4 INJECTION, SOLUTION INTRAVENOUS ONCE
Status: DISCONTINUED | OUTPATIENT
Start: 2023-08-15 | End: 2023-08-17 | Stop reason: HOSPADM

## 2023-08-15 RX ORDER — LABETALOL HYDROCHLORIDE 5 MG/ML
40 INJECTION, SOLUTION INTRAVENOUS ONCE AS NEEDED
Status: DISCONTINUED | OUTPATIENT
Start: 2023-08-15 | End: 2023-08-17 | Stop reason: HOSPADM

## 2023-08-15 RX ORDER — DIPHENOXYLATE HYDROCHLORIDE AND ATROPINE SULFATE 2.5; .025 MG/1; MG/1
1 TABLET ORAL 4 TIMES DAILY PRN
Status: DISCONTINUED | OUTPATIENT
Start: 2023-08-15 | End: 2023-08-17 | Stop reason: HOSPADM

## 2023-08-15 RX ORDER — LIDOCAINE HYDROCHLORIDE 10 MG/ML
10 INJECTION INFILTRATION; PERINEURAL ONCE AS NEEDED
Status: DISCONTINUED | OUTPATIENT
Start: 2023-08-15 | End: 2023-08-17 | Stop reason: HOSPADM

## 2023-08-15 RX ORDER — SODIUM CHLORIDE 9 MG/ML
INJECTION, SOLUTION INTRAVENOUS
Status: DISCONTINUED | OUTPATIENT
Start: 2023-08-15 | End: 2023-08-15

## 2023-08-15 RX ORDER — MEPERIDINE HYDROCHLORIDE 25 MG/ML
25 INJECTION INTRAMUSCULAR; INTRAVENOUS; SUBCUTANEOUS
Status: DISCONTINUED | OUTPATIENT
Start: 2023-08-15 | End: 2023-08-15

## 2023-08-15 RX ORDER — OXYCODONE HYDROCHLORIDE 5 MG/1
10 TABLET ORAL EVERY 4 HOURS PRN
Status: DISCONTINUED | OUTPATIENT
Start: 2023-08-15 | End: 2023-08-16

## 2023-08-15 RX ORDER — LABETALOL HYDROCHLORIDE 5 MG/ML
20 INJECTION, SOLUTION INTRAVENOUS ONCE AS NEEDED
Status: COMPLETED | OUTPATIENT
Start: 2023-08-15 | End: 2023-08-15

## 2023-08-15 RX ORDER — PROCHLORPERAZINE EDISYLATE 5 MG/ML
5 INJECTION INTRAMUSCULAR; INTRAVENOUS EVERY 6 HOURS PRN
Status: DISCONTINUED | OUTPATIENT
Start: 2023-08-15 | End: 2023-08-17 | Stop reason: HOSPADM

## 2023-08-15 RX ORDER — AMOXICILLIN 250 MG
1 CAPSULE ORAL NIGHTLY PRN
Status: DISCONTINUED | OUTPATIENT
Start: 2023-08-15 | End: 2023-08-17 | Stop reason: HOSPADM

## 2023-08-15 RX ORDER — BISACODYL 10 MG
10 SUPPOSITORY, RECTAL RECTAL ONCE AS NEEDED
Status: COMPLETED | OUTPATIENT
Start: 2023-08-15 | End: 2023-08-17

## 2023-08-15 RX ORDER — SODIUM CITRATE AND CITRIC ACID MONOHYDRATE 334; 500 MG/5ML; MG/5ML
30 SOLUTION ORAL ONCE
Status: COMPLETED | OUTPATIENT
Start: 2023-08-15 | End: 2023-08-15

## 2023-08-15 RX ORDER — MORPHINE SULFATE 4 MG/ML
4 INJECTION, SOLUTION INTRAMUSCULAR; INTRAVENOUS
Status: DISCONTINUED | OUTPATIENT
Start: 2023-08-15 | End: 2023-08-17 | Stop reason: HOSPADM

## 2023-08-15 RX ORDER — OXYTOCIN/RINGER'S LACTATE 30/500 ML
334 PLASTIC BAG, INJECTION (ML) INTRAVENOUS ONCE AS NEEDED
Status: DISCONTINUED | OUTPATIENT
Start: 2023-08-15 | End: 2023-08-17 | Stop reason: HOSPADM

## 2023-08-15 RX ORDER — OXYTOCIN/RINGER'S LACTATE 30/500 ML
0-30 PLASTIC BAG, INJECTION (ML) INTRAVENOUS CONTINUOUS
Status: DISCONTINUED | OUTPATIENT
Start: 2023-08-15 | End: 2023-08-15

## 2023-08-15 RX ORDER — LIDOCAINE HYDROCHLORIDE 20 MG/ML
10 INJECTION, SOLUTION EPIDURAL; INFILTRATION; INTRACAUDAL; PERINEURAL EVERY 5 MIN PRN
Status: DISCONTINUED | OUTPATIENT
Start: 2023-08-15 | End: 2023-08-15

## 2023-08-15 RX ORDER — FAMOTIDINE 10 MG/ML
20 INJECTION INTRAVENOUS ONCE
Status: COMPLETED | OUTPATIENT
Start: 2023-08-15 | End: 2023-08-15

## 2023-08-15 RX ORDER — OXYTOCIN/RINGER'S LACTATE 30/500 ML
95 PLASTIC BAG, INJECTION (ML) INTRAVENOUS ONCE AS NEEDED
Status: DISCONTINUED | OUTPATIENT
Start: 2023-08-15 | End: 2023-08-17 | Stop reason: HOSPADM

## 2023-08-15 RX ORDER — FENTANYL/ROPIVACAINE/NS/PF 2MCG/ML-.2
PLASTIC BAG, INJECTION (ML) INJECTION CONTINUOUS
Status: DISCONTINUED | OUTPATIENT
Start: 2023-08-15 | End: 2023-08-15

## 2023-08-15 RX ORDER — LABETALOL HYDROCHLORIDE 5 MG/ML
80 INJECTION, SOLUTION INTRAVENOUS ONCE AS NEEDED
Status: DISCONTINUED | OUTPATIENT
Start: 2023-08-15 | End: 2023-08-17 | Stop reason: HOSPADM

## 2023-08-15 RX ORDER — SIMETHICONE 80 MG
1 TABLET,CHEWABLE ORAL EVERY 6 HOURS PRN
Status: DISCONTINUED | OUTPATIENT
Start: 2023-08-15 | End: 2023-08-17 | Stop reason: HOSPADM

## 2023-08-15 RX ORDER — CALCIUM GLUCONATE 20 MG/ML
1 INJECTION, SOLUTION INTRAVENOUS
Status: DISCONTINUED | OUTPATIENT
Start: 2023-08-15 | End: 2023-08-17 | Stop reason: HOSPADM

## 2023-08-15 RX ORDER — CALCIUM CARBONATE 200(500)MG
500 TABLET,CHEWABLE ORAL 3 TIMES DAILY PRN
Status: DISCONTINUED | OUTPATIENT
Start: 2023-08-15 | End: 2023-08-17 | Stop reason: HOSPADM

## 2023-08-15 RX ORDER — KETOROLAC TROMETHAMINE 30 MG/ML
30 INJECTION, SOLUTION INTRAMUSCULAR; INTRAVENOUS EVERY 6 HOURS
Status: DISCONTINUED | OUTPATIENT
Start: 2023-08-16 | End: 2023-08-15

## 2023-08-15 RX ADMIN — SODIUM CITRATE AND CITRIC ACID MONOHYDRATE 30 ML: 500; 334 SOLUTION ORAL at 04:08

## 2023-08-15 RX ADMIN — MEPERIDINE HYDROCHLORIDE 25 MG: 25 INJECTION INTRAMUSCULAR; INTRAVENOUS; SUBCUTANEOUS at 04:08

## 2023-08-15 RX ADMIN — AMPICILLIN SODIUM 1 G: 1 INJECTION, POWDER, FOR SOLUTION INTRAMUSCULAR; INTRAVENOUS at 08:08

## 2023-08-15 RX ADMIN — PHENYLEPHRINE HYDROCHLORIDE 100 MCG: 10 INJECTION INTRAVENOUS at 05:08

## 2023-08-15 RX ADMIN — LIDOCAINE HYDROCHLORIDE 10 ML: 20 INJECTION, SOLUTION INTRAVENOUS at 09:08

## 2023-08-15 RX ADMIN — Medication 500 ML: at 05:08

## 2023-08-15 RX ADMIN — LIDOCAINE HYDROCHLORIDE 10 ML: 20 INJECTION, SOLUTION INTRAVENOUS at 02:08

## 2023-08-15 RX ADMIN — SODIUM CHLORIDE: 9 INJECTION, SOLUTION INTRAVENOUS at 03:08

## 2023-08-15 RX ADMIN — MORPHINE SULFATE 4 MG: 4 INJECTION, SOLUTION INTRAMUSCULAR; INTRAVENOUS at 10:08

## 2023-08-15 RX ADMIN — SODIUM CHLORIDE, POTASSIUM CHLORIDE, SODIUM LACTATE AND CALCIUM CHLORIDE: 600; 310; 30; 20 INJECTION, SOLUTION INTRAVENOUS at 04:08

## 2023-08-15 RX ADMIN — LIDOCAINE HYDROCHLORIDE 10 ML: 20 INJECTION, SOLUTION INTRAVENOUS at 05:08

## 2023-08-15 RX ADMIN — CEFAZOLIN 2 G: 2 INJECTION, POWDER, FOR SOLUTION INTRAMUSCULAR; INTRAVENOUS at 04:08

## 2023-08-15 RX ADMIN — MORPHINE SULFATE 4 MG: 4 INJECTION, SOLUTION INTRAMUSCULAR; INTRAVENOUS at 06:08

## 2023-08-15 RX ADMIN — ROPIVACAINE HYDROCHLORIDE 12 ML/HR: 10 INJECTION EPIDURAL at 09:08

## 2023-08-15 RX ADMIN — MEPERIDINE HYDROCHLORIDE 25 MG: 25 INJECTION INTRAMUSCULAR; INTRAVENOUS; SUBCUTANEOUS at 08:08

## 2023-08-15 RX ADMIN — ONDANSETRON 8 MG: 4 TABLET, ORALLY DISINTEGRATING ORAL at 09:08

## 2023-08-15 RX ADMIN — DOCUSATE SODIUM 200 MG: 100 CAPSULE, LIQUID FILLED ORAL at 09:08

## 2023-08-15 RX ADMIN — ROPIVACAINE HYDROCHLORIDE 500 MCG: 10 INJECTION EPIDURAL at 10:08

## 2023-08-15 RX ADMIN — LABETALOL HYDROCHLORIDE 20 MG: 5 INJECTION INTRAVENOUS at 07:08

## 2023-08-15 RX ADMIN — ALBUTEROL SULFATE 2.5 MG: 0.83 SOLUTION RESPIRATORY (INHALATION) at 06:08

## 2023-08-15 RX ADMIN — KETOROLAC TROMETHAMINE 30 MG: 30 INJECTION, SOLUTION INTRAMUSCULAR at 05:08

## 2023-08-15 RX ADMIN — Medication 2 MILLI-UNITS/MIN: at 01:08

## 2023-08-15 RX ADMIN — SODIUM CHLORIDE, POTASSIUM CHLORIDE, SODIUM LACTATE AND CALCIUM CHLORIDE: 600; 310; 30; 20 INJECTION, SOLUTION INTRAVENOUS at 09:08

## 2023-08-15 RX ADMIN — SODIUM CHLORIDE, POTASSIUM CHLORIDE, SODIUM LACTATE AND CALCIUM CHLORIDE: 600; 310; 30; 20 INJECTION, SOLUTION INTRAVENOUS at 03:08

## 2023-08-15 RX ADMIN — AMPICILLIN SODIUM 1 G: 1 INJECTION, POWDER, FOR SOLUTION INTRAMUSCULAR; INTRAVENOUS at 12:08

## 2023-08-15 RX ADMIN — ONDANSETRON 4 MG: 2 INJECTION INTRAMUSCULAR; INTRAVENOUS at 04:08

## 2023-08-15 RX ADMIN — FAMOTIDINE 20 MG: 10 INJECTION, SOLUTION INTRAVENOUS at 04:08

## 2023-08-15 RX ADMIN — AMPICILLIN SODIUM 2 G: 2 INJECTION, POWDER, FOR SOLUTION INTRAMUSCULAR; INTRAVENOUS at 04:08

## 2023-08-15 NOTE — SUBJECTIVE & OBJECTIVE
Obstetric HPI:  Patient reports painful contractions every 3-4 minutes, active fetal movement, absent vaginal bleeding , present loss of fluid      Objective:     Vital Signs (Most Recent):  Temp: 97.3 °F (36.3 °C) (08/15/23 0320)  Pulse: 84 (08/15/23 0320)  Resp: 20 (08/15/23 0456)  BP: 133/71 (08/15/23 0320)  SpO2: 99 % (08/15/23 0320) Vital Signs (24h Range):  Temp:  [97.3 °F (36.3 °C)] 97.3 °F (36.3 °C)  Pulse:  [84-91] 84  Resp:  [19-20] 20  SpO2:  [99 %] 99 %  BP: (111-133)/(71-72) 133/71     Weight: 81.8 kg (180 lb 6.4 oz)  Body mass index is 33 kg/m².    FHT: 140  Cat 1 (reassuring)  TOCO:  Q 3-4 minutes    No intake or output data in the 24 hours ending 08/15/23 0509    Cervical Exam:Per Nurse  Dilation:   3.5  Effacement:  70  Station: -3  Presentation: Vertex     Significant Labs:  Recent Lab Results         08/15/23  0349   08/15/23  0258   08/14/23  1030        Bilirubin, POC     NEG       Spec Grav UA     <=1.005       Blood, UA     SMALL       pH, UA     6.0       Protein, POC     NEG       Urobilinogen, UA     0.2       Nitrite, UA     NEG       Albumin/Globulin Ratio 0.8           Albumin 2.7           Alkaline Phosphatase 210           ALT 19           Anion Gap 14           Appearance, UA   Clear         AST 29           Bacteria, UA   Occasional         Baso # 0.04           Basophil % 0.4           Bilirubin (UA)   Negative         BILIRUBIN TOTAL 0.5           BUN 5           BUN/CREAT RATIO 7           Calcium 9.0           Chloride 111           CO2 18           Color, UA   Colorless         Creatinine 0.71           Differential Type Auto           eGFR 126           Eos # 0.03           Eosinophil % 0.3           Globulin, Total 3.6           Glucose 70           Glucose, UA     NEG       Glucose, UA   Normal         Group & Rh B POS           Hematocrit 34.5           Hemoglobin 11.8           Immature Grans (Abs) 0.05           Immature Granulocytes 0.5           INDIRECT DEVIN NEG            Ketones, UA     NEG       Ketones, UA   40         Leukocytes, UA   Moderate         Lymph # 3.04           Lymph % 29.7           MCH 30.7           MCHC 34.2           MCV 89.8           Mono # 0.87           Mono % 8.5           MPV 11.1           Neutrophils, Abs 6.20           Neutrophils Relative 60.6           NITRITE UA   Negative         nRBC 0.0           NUCLEATED RBC ABSOLUTE 0.00           Occult Blood UA   Negative         pH, UA   6.5         Platelets 199           Potassium 3.6           PROTEIN TOTAL 6.3           Protein, UA   Negative         RBC 3.84           RBC, UA   2         RDW 13.4           Sodium 139           Specific [place], UA   1.005         Specimen Outdate 08/18/2023 23:59           Squamous Epithelial Cells, UA   Occasional         UROBILINOGEN UA   Normal         WBC, UA     SMALL       WBC, UA   3         WBC 10.23                   Physical Exam:   Constitutional: She is oriented to person, place, and time. She appears well-developed and well-nourished. She appears distressed.    HENT:   Head: Normocephalic and atraumatic.    Eyes: Pupils are equal, round, and reactive to light. EOM are normal.     Cardiovascular:  Normal rate and regular rhythm.             Pulmonary/Chest: Effort normal. No respiratory distress.        Abdominal: Soft. There is no abdominal tenderness.     Genitourinary:    Genitourinary Comments: Grossly ruptured             Musculoskeletal: Moves all extremeties.       Neurological: She is alert and oriented to person, place, and time.    Skin: Skin is warm and dry.    Psychiatric: She has a normal mood and affect. Her behavior is normal.       Review of Systems   Constitutional:  Negative for chills, diaphoresis and fever.   HENT:  Negative for nasal congestion.    Eyes:  Negative for visual disturbance.   Respiratory:  Negative for shortness of breath.    Cardiovascular:  Negative for chest pain.   Gastrointestinal:  Negative for abdominal pain,  constipation, diarrhea, nausea, vomiting and reflux.   Genitourinary:  Negative for dysuria, flank pain, hematuria and urgency.   Musculoskeletal:  Positive for back pain.   Neurological:  Negative for headaches.

## 2023-08-15 NOTE — PROGRESS NOTES
.[facility] -  Labor and Delivery  Obstetrics  Labor Progress Note    Patient Name: Shayy Mehta  MRN: [id number]  Admission Date: 8/15/2023  Hospital Length of Stay: 0 days  Attending Physician: Jhonatan Briscoe MD  Primary Care Provider: Bonita Gao, LUZ, FNP    Subjective:     Principal Problem:Delayed delivery after SROM (spontaneous rupture of membranes)    Interval History:  Shayy is a 17 y.o.  at 40w3d. She is doing well.     Objective:     Vital Signs (Most Recent):  Temp: 97.3 °F (36.3 °C) (08/15/23 032)  Pulse: 73 (08/15/23 0725)  Resp: 20 (08/15/23 0456)  BP: 123/68 (08/15/23 0725)  SpO2: 99 % (08/15/23 032) Vital Signs (24h Range):  Temp:  [97.3 °F (36.3 °C)] 97.3 °F (36.3 °C)  Pulse:  [73-91] 73  Resp:  [19-20] 20  SpO2:  [99 %] 99 %  BP: (106-133)/(51-73) 123/68     Weight: 81.8 kg (180 lb 6.4 oz)  Body mass index is 33 kg/m².    FHT: 115s Cat 1 (reassuring)  TOCO:  Q 2-3 minutes    Physical Exam    Cervical Exam:  Dilation:  4  Effacement:  70%  Station: -2  Presentation: Vertex   AROM with amniohook- light green mecounium noted  IUPC inserted with ease    Significant Labs:  Lab Results   Component Value Date    GROUPTRH B POS 08/15/2023    HEPBSAG Non-Reactive 08/15/2023       I have personallly reviewed all pertinent lab results from the last 24 hours.  Recent Lab Results         08/15/23  0349   08/15/23  0258   23  1030        Bilirubin, POC     NEG       Spec Grav UA     <=1.005       Blood, UA     SMALL       pH, UA     6.0       Protein, POC     NEG       Urobilinogen, UA     0.2       Nitrite, UA     NEG       Albumin/Globulin Ratio 0.8           Albumin 2.7           Alkaline Phosphatase 210           ALT 19           Anion Gap 14           Appearance, UA   Clear         AST 29           Bacteria, UA   Occasional         Baso # 0.04           Basophil % 0.4           Bilirubin (UA)   Negative         BILIRUBIN TOTAL 0.5           BUN 5           BUN/CREAT  RATIO 7           Calcium 9.0           Chloride 111           CO2 18           Color, UA   Colorless         Creatinine 0.71           Differential Type Auto           eGFR 126           Eos # 0.03           Eosinophil % 0.3           Globulin, Total 3.6           Glucose 70           Glucose, UA     NEG       Glucose, UA   Normal         Group & Rh B POS           Hematocrit 34.5           Hemoglobin 11.8           Hepatitis B Surface Ag Non-Reactive           HIV 1/2 Ag/Ab Non-Reactive           Immature Grans (Abs) 0.05           Immature Granulocytes 0.5           INDIRECT DEVIN NEG           Ketones, UA     NEG       Ketones, UA   40         Leukocytes, UA   Moderate         Lymph # 3.04           Lymph % 29.7           MCH 30.7           MCHC 34.2           MCV 89.8           Mono # 0.87           Mono % 8.5           MPV 11.1           Neutrophils, Abs 6.20           Neutrophils Relative 60.6           NITRITE UA   Negative         nRBC 0.0           NUCLEATED RBC ABSOLUTE 0.00           Occult Blood UA   Negative         pH, UA   6.5         Platelets 199           Potassium 3.6           PROTEIN TOTAL 6.3           Protein, UA   Negative         RBC 3.84           RBC, UA   2         RDW 13.4           Sodium 139           Specific [place], UA   1.005         Specimen Outdate 2023 23:59           Squamous Epithelial Cells, UA   Occasional         Syphilis Ab Interpretation Non-Reactive  Comment: 0.0 - 0.9: Non-Reactive  0.91 - 1.10: Equivocal with RPR to follow  >1.10:  Reactive with RPR to Follow           UROBILINOGEN UA   Normal         WBC, UA     SMALL       WBC, UA   3         WBC 10.23                   Assessment/Plan:     17 y.o. female  at 40w3d for:    Active Diagnoses:    Diagnosis Date Noted POA    PRINCIPAL PROBLEM:  Delayed delivery after SROM (spontaneous rupture of membranes) [O42.90] 08/15/2023 Yes    40 weeks gestation of pregnancy [Z3A.40] 08/15/2023 Not Applicable     Positive GBS test [B95.1] 08/15/2023 Yes      Problems Resolved During this Admission:     Pitocin augmentation of labor  Continue antibiotics until delivery  Reposition for comfort  May have an epidural if desired  Verbalized understanding to all instructions and information  Questions answered to desired level of satisfaction      Helen Hoff, BALJIT  Obstetrics  [facility] -  Labor and Delivery

## 2023-08-15 NOTE — HPI
Patient is a 17-year-old  1 para 0 presents at 40 weeks and 3 days with complaints of uterine contractions t her provider the colpo hat are occurring every 3-4 minutes apart.  Patient is grossly ruptured and 3-4 cm 70% and-3 station.  She reports that her water broke at 1:30 a.m..  She is being admitted at this time to her provider Helen Hoff CNM..  Patient is GBS positive and is receiving ampicillin at this time.  Spontaneous vaginal delivery is anticipated.

## 2023-08-15 NOTE — H&P
[facility] -  Labor and Delivery  Obstetrics  Antepartum Progress Note    Patient Name: Shayy Mehta  MRN: [id number]  Admission Date: 8/15/2023  Hospital Length of Stay: 0 days  Attending Physician: Juice Valiente MD  Primary Care Provider: Bonita Gao, LUZ, FNP    Subjective:     Principal Problem:Delayed delivery after SROM (spontaneous rupture of membranes)    HPI:  Patient is a 17-year-old  1 para 0 presents at 40 weeks and 3 days with complaints of uterine contractions t her provider the colpo hat are occurring every 3-4 minutes apart.  Patient is grossly ruptured and 3-4 cm 70% and-3 station.  She reports that her water broke at 1:30 a.m..  She is being admitted at this time to her provider Helen Hoff CNM..  Patient is GBS positive and is receiving ampicillin at this time.  Spontaneous vaginal delivery is anticipated.      Hospital Course:  No notes on file    Obstetric HPI:  Patient reports painful contractions every 3-4 minutes, active fetal movement, absent vaginal bleeding , present loss of fluid      Objective:     Vital Signs (Most Recent):  Temp: 97.3 °F (36.3 °C) (08/15/23 0320)  Pulse: 84 (08/15/23 0320)  Resp: 20 (08/15/23 0456)  BP: 133/71 (08/15/23 0320)  SpO2: 99 % (08/15/23 0320) Vital Signs (24h Range):  Temp:  [97.3 °F (36.3 °C)] 97.3 °F (36.3 °C)  Pulse:  [84-91] 84  Resp:  [19-20] 20  SpO2:  [99 %] 99 %  BP: (111-133)/(71-72) 133/71     Weight: 81.8 kg (180 lb 6.4 oz)  Body mass index is 33 kg/m².    FHT: 140  Cat 1 (reassuring)  TOCO:  Q 3-4 minutes    No intake or output data in the 24 hours ending 08/15/23 0509    Cervical Exam:Per Nurse  Dilation:   3.5  Effacement:  70  Station: -3  Presentation: Vertex     Significant Labs:  Recent Lab Results         08/15/23  0349   08/15/23  0258   23  1030        Bilirubin, POC     NEG       Spec Grav UA     <=1.005       Blood, UA     SMALL       pH, UA     6.0       Protein, POC     NEG       Urobilinogen, UA      0.2       Nitrite, UA     NEG       Albumin/Globulin Ratio 0.8           Albumin 2.7           Alkaline Phosphatase 210           ALT 19           Anion Gap 14           Appearance, UA   Clear         AST 29           Bacteria, UA   Occasional         Baso # 0.04           Basophil % 0.4           Bilirubin (UA)   Negative         BILIRUBIN TOTAL 0.5           BUN 5           BUN/CREAT RATIO 7           Calcium 9.0           Chloride 111           CO2 18           Color, UA   Colorless         Creatinine 0.71           Differential Type Auto           eGFR 126           Eos # 0.03           Eosinophil % 0.3           Globulin, Total 3.6           Glucose 70           Glucose, UA     NEG       Glucose, UA   Normal         Group & Rh B POS           Hematocrit 34.5           Hemoglobin 11.8           Immature Grans (Abs) 0.05           Immature Granulocytes 0.5           INDIRECT DEVIN NEG           Ketones, UA     NEG       Ketones, UA   40         Leukocytes, UA   Moderate         Lymph # 3.04           Lymph % 29.7           MCH 30.7           MCHC 34.2           MCV 89.8           Mono # 0.87           Mono % 8.5           MPV 11.1           Neutrophils, Abs 6.20           Neutrophils Relative 60.6           NITRITE UA   Negative         nRBC 0.0           NUCLEATED RBC ABSOLUTE 0.00           Occult Blood UA   Negative         pH, UA   6.5         Platelets 199           Potassium 3.6           PROTEIN TOTAL 6.3           Protein, UA   Negative         RBC 3.84           RBC, UA   2         RDW 13.4           Sodium 139           Specific [place], UA   1.005         Specimen Outdate 08/18/2023 23:59           Squamous Epithelial Cells, UA   Occasional         UROBILINOGEN UA   Normal         WBC, UA     SMALL       WBC, UA   3         WBC 10.23                   Physical Exam:   Constitutional: She is oriented to person, place, and time. She appears well-developed and well-nourished. She appears distressed.     HENT:   Head: Normocephalic and atraumatic.    Eyes: Pupils are equal, round, and reactive to light. EOM are normal.     Cardiovascular:  Normal rate and regular rhythm.             Pulmonary/Chest: Effort normal. No respiratory distress.        Abdominal: Soft. There is no abdominal tenderness.     Genitourinary:    Genitourinary Comments: Grossly ruptured             Musculoskeletal: Moves all extremeties.       Neurological: She is alert and oriented to person, place, and time.    Skin: Skin is warm and dry.    Psychiatric: She has a normal mood and affect. Her behavior is normal.       Review of Systems   Constitutional:  Negative for chills, diaphoresis and fever.   HENT:  Negative for nasal congestion.    Eyes:  Negative for visual disturbance.   Respiratory:  Negative for shortness of breath.    Cardiovascular:  Negative for chest pain.   Gastrointestinal:  Negative for abdominal pain, constipation, diarrhea, nausea, vomiting and reflux.   Genitourinary:  Negative for dysuria, flank pain, hematuria and urgency.   Musculoskeletal:  Positive for back pain.   Neurological:  Negative for headaches.       Assessment/Plan:     17 y.o. female  at 40w3d for:    * Delayed delivery after SROM (spontaneous rupture of membranes)  .    Positive GBS test  IV Ampicillin initiated per protocol.    40 weeks gestation of pregnancy  Patient presents in labor with grossly ruptured membranes      Discussed with Helen Hoff CNM.  Patient was  admitted to her service and spontaneous vaginal delivery is anticipated..      Juice Valiente MD  Obstetrics  [facility] -  Labor and Delivery

## 2023-08-15 NOTE — PROGRESS NOTES
[facility] -  Labor and Delivery  Obstetrics  Labor Progress Note    Patient Name: Shayy Mehta  MRN: [id number]  Admission Date: 8/15/2023  Hospital Length of Stay: 0 days  Attending Physician: Jhonatan Briscoe MD  Primary Care Provider: Bonita Gao, LUZ, FNP    Subjective:     Principal Problem:Delayed delivery after SROM (spontaneous rupture of membranes)    Interval History:  Shayy is a 17 y.o.  at 40w3d. She is doing well.     Objective:     Vital Signs (Most Recent):  Temp: 98.1 °F (36.7 °C) (08/15/23 0751)  Pulse: 70 (08/15/23 1444)  Resp: 18 (08/15/23 1011)  BP: 113/64 (08/15/23 1444)  SpO2: (!) 94 % (08/15/23 1300) Vital Signs (24h Range):  Temp:  [96.6 °F (35.9 °C)-98.1 °F (36.7 °C)] 98.1 °F (36.7 °C)  Pulse:  [] 70  Resp:  [18-22] 18  SpO2:  [90 %-100 %] 94 %  BP: (100-162)/() 113/64     Weight: 81.8 kg (180 lb 6.4 oz)  Body mass index is 33 kg/m².    FHT: 60s with fetal bradycardia noted and recovers with good variability noted- improved with IV hydration, O2, and amnioinfusion. Pitocin off    TOCO:  Q 2-5 minutes    Physical Exam:   Constitutional: She is oriented to person, place, and time. She appears well-developed and well-nourished.                           Neurological: She is alert and oriented to person, place, and time.    Skin: Skin is warm and dry.        Cervical Exam:  Dilation:  6  Effacement:  80%  Station: 0  Presentation: Vertex     Significant Labs:  Lab Results   Component Value Date    GROUPTRH B POS 08/15/2023    HEPBSAG Non-Reactive 08/15/2023       I have personallly reviewed all pertinent lab results from the last 24 hours.    Assessment/Plan:     17 y.o. female  at 40w3d for:    Active Diagnoses:    Diagnosis Date Noted POA    PRINCIPAL PROBLEM:  Delayed delivery after SROM (spontaneous rupture of membranes) [O42.90] 08/15/2023 Yes    40 weeks gestation of pregnancy [Z3A.40] 08/15/2023 Not Applicable    Positive GBS test [B95.1]  08/15/2023 Yes      Problems Resolved During this Admission:       Discussed possible  section secondary to fetal intolerance to labor  Discussed risks of  section to include pain, infection, injury to bladder and or bowel, or injury to other organs  Discussed repositioning for comfort  Continue amnioinfusion  Verbalized understanding to all instructions and information  Questions answered to desired level of satisfaction      Helen Hoff CNM  Obstetrics  [facility] -  Labor and Delivery

## 2023-08-15 NOTE — PLAN OF CARE
Problem: Adult Inpatient Plan of Care  Goal: Plan of Care Review  8/15/2023 0348 by Edwige Louis RN  Outcome: Ongoing, Progressing  8/15/2023 0348 by Edwige Louis RN  Outcome: Ongoing, Progressing  Goal: Patient-Specific Goal (Individualized)  8/15/2023 0348 by Edwige Louis RN  Outcome: Ongoing, Progressing  8/15/2023 0348 by Edwige Louis RN  Outcome: Ongoing, Progressing  Goal: Absence of Hospital-Acquired Illness or Injury  8/15/2023 0348 by Edwige Louis RN  Outcome: Ongoing, Progressing  8/15/2023 0348 by Edwige Louis RN  Outcome: Ongoing, Progressing  Goal: Optimal Comfort and Wellbeing  8/15/2023 0348 by Edwige Louis RN  Outcome: Ongoing, Progressing  8/15/2023 0348 by Edwige Louis RN  Outcome: Ongoing, Progressing  Goal: Readiness for Transition of Care  8/15/2023 0348 by Edwige Louis RN  Outcome: Ongoing, Progressing  8/15/2023 0348 by Edwige Louis RN  Outcome: Ongoing, Progressing     Problem:  Fall Injury Risk  Goal: Absence of Fall, Infant Drop and Related Injury  8/15/2023 0348 by Edwige Louis RN  Outcome: Ongoing, Progressing  8/15/2023 0348 by Edwige Louis RN  Outcome: Ongoing, Progressing     Problem: Bleeding (Labor)  Goal: Hemostasis  8/15/2023 0348 by Edwige Louis RN  Outcome: Ongoing, Progressing  8/15/2023 0348 by Edwige Louis RN  Outcome: Ongoing, Progressing     Problem: Change in Fetal Wellbeing (Labor)  Goal: Stable Fetal Wellbeing  8/15/2023 0348 by Edwige Louis RN  Outcome: Ongoing, Progressing  8/15/2023 0348 by Edwige Louis RN  Outcome: Ongoing, Progressing     Problem: Delayed Labor Progression (Labor)  Goal: Effective Progression to Delivery  8/15/2023 0348 by Edwige Louis RN  Outcome: Ongoing, Progressing  8/15/2023 0348 by Gallaspy, Malori C, RN  Outcome: Ongoing, Progressing     Problem: Infection (Labor)  Goal: Absence of Infection Signs and  Symptoms  8/15/2023 0348 by Edwige Louis RN  Outcome: Ongoing, Progressing  8/15/2023 0348 by Edwige Louis RN  Outcome: Ongoing, Progressing     Problem: Labor Pain (Labor)  Goal: Acceptable Pain Control  8/15/2023 0348 by Edwige Louis, MELANY  Outcome: Ongoing, Progressing  8/15/2023 0348 by Edwige Louis RN  Outcome: Ongoing, Progressing     Problem: Uterine Tachysystole (Labor)  Goal: Normal Uterine Contraction Pattern  8/15/2023 0348 by Edwige Louis, MELANY  Outcome: Ongoing, Progressing  8/15/2023 0348 by Edwige Louis RN  Outcome: Ongoing, Progressing

## 2023-08-15 NOTE — ANESTHESIA PROCEDURE NOTES
Epidural    Patient location during procedure: OB   Reason for block: primary anesthetic   Reason for block: labor analgesia requested by patient and obstetrician  Diagnosis: IUP   Start time: 8/15/2023 9:36 AM  Timeout: 8/15/2023 9:35 AM  End time: 8/15/2023 9:44 AM    Staffing  Performing Provider: Ricardo Leone MD  Authorizing Provider: Ricardo Leone MD    Staffing  Performed by: Ricardo Leone MD  Authorized by: Ricardo Leone MD        Preanesthetic Checklist  Completed: patient identified, pre-op evaluation, timeout performed, anesthesia consent given, hand hygiene performed and patient being monitored  Preparation  Patient position: sitting  Prep: Betadine  Reason for block: primary anesthetic   Epidural  Skin Anesthetic: lidocaine 1%  Administration type: single shot  Approach: midline  Interspace: L4-5    Injection technique: HEATHER air  Needle and Epidural Catheter  Insertion Attempts: 1  Test dose: 3 mL of lidocaine 1.5% with Epi 1-to-200,000  Additional Documentation: negative aspiration for heme and CSF  Needle localization: anatomical landmarks  Assessment  Ease of block: easy  Additional Notes  Informed consent. Aseptic technique.   L4/5 epidural catheter. Standard PCEA.   No complications.         Medications:    Medications: LIDOcaine (PF) 20 mg/mL (2%) injection - Epidural   10 mL - 8/15/2023 9:43:00 AM

## 2023-08-15 NOTE — TRANSFER OF CARE
"Anesthesia Transfer of Care Note    Patient: Shayy Mehta    Procedure(s) Performed: Procedure(s) (LRB):   SECTION (N/A)    Patient location: Labor and Delivery    Anesthesia Type: epidural    Transport from OR: Transported from OR on room air with adequate spontaneous ventilation    Post pain: adequate analgesia    Post assessment: no apparent anesthetic complications and tolerated procedure well    Post vital signs: stable    Level of consciousness: awake, alert and oriented    Nausea/Vomiting: no nausea/vomiting    Complications: none    Transfer of care protocol was followedComments: Tolerated transport well. VSS. NAD. Voices comfort. Report to receiving RN at bedside.       Last vitals:   Visit Vitals  /80 (BP Location: Left arm, Patient Position: Lying)   Pulse (!) 112   Temp 36.3 °C (97.3 °F)   Resp (!) 22   Ht 5' 2" (1.575 m)   Wt 81.8 kg (180 lb 6.4 oz)   LMP 2022 (Exact Date)   SpO2 100%   BMI 33.00 kg/m²     "

## 2023-08-15 NOTE — ANESTHESIA PREPROCEDURE EVALUATION
08/15/2023  Shayy Mehta is a 17 y.o., female.      Pre-op Assessment    I have reviewed the Patient Summary Reports.    I have reviewed the NPO Status.   I have reviewed the Medications.     Review of Systems         Anesthesia Plan  Type of Anesthesia, risks & benefits discussed:    Anesthesia Type: Epidural  Informed Consent: Informed consent signed with the Patient and all parties understand the risks and agree with anesthesia plan.  All questions answered.   ASA Score: 2    Ready For Surgery From Anesthesia Perspective.     .  NKDA    Hct 35     at 40 weeks  Asthma    Adequate ROM at neck    Plan is labor epidural

## 2023-08-16 PROBLEM — Z98.891 STATUS POST CESAREAN DELIVERY: Status: ACTIVE | Noted: 2023-08-16

## 2023-08-16 LAB
BASOPHILS # BLD AUTO: 0.05 K/UL (ref 0–0.2)
BASOPHILS NFR BLD AUTO: 0.4 % (ref 0–1)
DIFFERENTIAL METHOD BLD: ABNORMAL
EOSINOPHIL # BLD AUTO: 0.01 K/UL (ref 0–0.5)
EOSINOPHIL NFR BLD AUTO: 0.1 % (ref 1–4)
ERYTHROCYTE [DISTWIDTH] IN BLOOD BY AUTOMATED COUNT: 13.2 % (ref 11.5–14.5)
HCT VFR BLD AUTO: 32.4 % (ref 38–47)
HGB BLD-MCNC: 11 G/DL (ref 12–16)
IMM GRANULOCYTES # BLD AUTO: 0.1 K/UL (ref 0–0.04)
IMM GRANULOCYTES NFR BLD: 0.7 % (ref 0–0.4)
LYMPHOCYTES # BLD AUTO: 2.29 K/UL (ref 1–4.8)
LYMPHOCYTES NFR BLD AUTO: 16.2 % (ref 27–41)
MCH RBC QN AUTO: 30.7 PG (ref 27–31)
MCHC RBC AUTO-ENTMCNC: 34 G/DL (ref 32–36)
MCV RBC AUTO: 90.5 FL (ref 80–96)
MONOCYTES # BLD AUTO: 1.25 K/UL (ref 0–0.8)
MONOCYTES NFR BLD AUTO: 8.9 % (ref 2–6)
MPC BLD CALC-MCNC: 10.7 FL (ref 9.4–12.4)
NEUTROPHILS # BLD AUTO: 10.42 K/UL (ref 1.8–7.7)
NEUTROPHILS NFR BLD AUTO: 73.7 % (ref 53–65)
NRBC # BLD AUTO: 0 X10E3/UL
NRBC, AUTO (.00): 0 %
PLATELET # BLD AUTO: 169 K/UL (ref 150–400)
RBC # BLD AUTO: 3.58 M/UL (ref 4.2–5.4)
WBC # BLD AUTO: 14.12 K/UL (ref 4.5–11)

## 2023-08-16 PROCEDURE — 11000001 HC ACUTE MED/SURG PRIVATE ROOM

## 2023-08-16 PROCEDURE — 25000003 PHARM REV CODE 250: Performed by: OBSTETRICS & GYNECOLOGY

## 2023-08-16 PROCEDURE — 94640 AIRWAY INHALATION TREATMENT: CPT

## 2023-08-16 PROCEDURE — 25000003 PHARM REV CODE 250: Performed by: ANESTHESIOLOGY

## 2023-08-16 PROCEDURE — 63600175 PHARM REV CODE 636 W HCPCS: Performed by: OBSTETRICS & GYNECOLOGY

## 2023-08-16 PROCEDURE — 85025 COMPLETE CBC W/AUTO DIFF WBC: CPT | Performed by: OBSTETRICS & GYNECOLOGY

## 2023-08-16 PROCEDURE — 99900035 HC TECH TIME PER 15 MIN (STAT)

## 2023-08-16 PROCEDURE — 94761 N-INVAS EAR/PLS OXIMETRY MLT: CPT

## 2023-08-16 RX ORDER — OXYCODONE AND ACETAMINOPHEN 5; 325 MG/1; MG/1
1 TABLET ORAL EVERY 4 HOURS PRN
Status: DISCONTINUED | OUTPATIENT
Start: 2023-08-16 | End: 2023-08-17 | Stop reason: HOSPADM

## 2023-08-16 RX ORDER — IBUPROFEN 800 MG/1
800 TABLET ORAL EVERY 8 HOURS PRN
Status: DISCONTINUED | OUTPATIENT
Start: 2023-08-16 | End: 2023-08-17 | Stop reason: HOSPADM

## 2023-08-16 RX ORDER — TRIPROLIDINE/PSEUDOEPHEDRINE 2.5MG-60MG
800 TABLET ORAL EVERY 8 HOURS PRN
Status: DISCONTINUED | OUTPATIENT
Start: 2023-08-16 | End: 2023-08-16

## 2023-08-16 RX ORDER — OXYCODONE AND ACETAMINOPHEN 10; 325 MG/1; MG/1
1 TABLET ORAL EVERY 4 HOURS PRN
Status: DISCONTINUED | OUTPATIENT
Start: 2023-08-16 | End: 2023-08-17 | Stop reason: HOSPADM

## 2023-08-16 RX ORDER — OXYCODONE AND ACETAMINOPHEN 5; 325 MG/1; MG/1
TABLET ORAL
Status: DISPENSED
Start: 2023-08-16 | End: 2023-08-17

## 2023-08-16 RX ADMIN — OXYCODONE HYDROCHLORIDE AND ACETAMINOPHEN 1 TABLET: 5; 325 TABLET ORAL at 07:08

## 2023-08-16 RX ADMIN — DOCUSATE SODIUM 200 MG: 100 CAPSULE, LIQUID FILLED ORAL at 09:08

## 2023-08-16 RX ADMIN — SODIUM CHLORIDE, POTASSIUM CHLORIDE, SODIUM LACTATE AND CALCIUM CHLORIDE: 600; 310; 30; 20 INJECTION, SOLUTION INTRAVENOUS at 12:08

## 2023-08-16 RX ADMIN — IBUPROFEN 800 MG: 800 TABLET, FILM COATED ORAL at 09:08

## 2023-08-16 RX ADMIN — OXYCODONE HYDROCHLORIDE 10 MG: 5 TABLET ORAL at 12:08

## 2023-08-16 RX ADMIN — IBUPROFEN 800 MG: 800 TABLET, FILM COATED ORAL at 01:08

## 2023-08-16 RX ADMIN — ACETAMINOPHEN 650 MG: 325 TABLET ORAL at 06:08

## 2023-08-16 RX ADMIN — ACETAMINOPHEN 650 MG: 325 TABLET ORAL at 12:08

## 2023-08-16 RX ADMIN — Medication 1 TABLET: at 09:08

## 2023-08-16 NOTE — L&D DELIVERY NOTE
"[facility] -  Labor and Delivery   Section   Operative Note    SUMMARY     Date of Procedure: 8/15/2023     Procedure: Procedure(s) (LRB):   SECTION (N/A)    Surgeon(s) and Role:     * Jhonatan Brsicoe MD - Primary    Assisting Surgeon: None    Pre-Operative Diagnosis: Pregnant [Z34.90]  40 weeks gestation  Fetal intolerance to labor  Post-Operative Diagnosis: Post-Op Diagnosis Codes:     * Pregnant [Z34.90]  same  Anesthesia: Spinal/Epidural    Technical Procedures Used: primary LTCS           Description of the Findings of the Procedure: liveborn male     Significant Surgical Tasks Conducted by the Assistant(s), if Applicable: none    Complications: No    Blood Loss: 400 mL     With patient in supine position, the legs are  and Guajardo Catheter placed and positioning to supine done.   Abdomen prepped with Chloroprep and 3 minute drying time allowed prior to draping of the abdomen.   Time out taken with OR team members.Specimens:   Specimen (24h ago, onward)      None            Condition: Good    VTE Risk Mitigation (From admission, onward)           Ordered     IP VTE LOW RISK PATIENT  Once         08/15/23 1318     Place sequential compression device  Until discontinued         08/15/23 0347                    Disposition: PACU - hemodynamically stable.    Attestation: Good         Delivery Information for Aakash Mehta    Birth information:  YOB: 2025   Time of birth: 5:11 PM   Sex: male   Head Delivery Date/Time: 8/15/2023  5:11 PM   Delivery type: , Low Transverse   Gestational Age: 40w3d        Delivery Providers    Delivering clinician: Jhonatan Briscoe MD   Provider Role    Helen Hoff CNM               Measurements    Weight: 3352 g  Weight (lbs): 7 lb 6.2 oz  Length: 50.8 cm  Length (in): 20"         Apgars    Living status: Living  Apgar Component Scores:  1 min.:  5 min.:  10 min.:  15 min.:  20 min.:    Skin color:  0 "  1       Heart rate:  2  2       Reflex irritability:  1  2       Muscle tone:  0  2       Respiratory effort:  1  2       Total:  4  9       Apgars assigned by: LUIS UMAÑA         Operative Delivery    Forceps attempted?: No  Vacuum extractor attempted?: No         Shoulder Dystocia    Shoulder dystocia present?: No           Presentation    Presentation: Vertex           Interventions/Resuscitation    Method: Bulb Suctioning, Blow By Oxygen, Tactile Stimulation, CPAP, PPV, NICU Attended       Cord    Vessels: 3 vessels  Complications: None  Delayed Cord Clamping?: No  Cord Blood Disposition: Sent with Baby  Gases Sent?: No  Stem Cell Collection (by MD): No       Placenta    Placenta delivery date/time: 8/15/2023 1713  Placenta removal: Manual removal  Placenta appearance: Intact  Placenta disposition: Discarded           Labor Events:       labor: No     Labor Onset Date/Time: 08/15/2023 01:30     Dilation Complete Date/Time:         Start Pushing Date/Time:       Rupture Date/Time: 08/15/23  0755         Rupture type: Forebag         Fluid Amount:       Fluid Color: Meconium Thin       steroids: None     Antibiotics given for GBS: Yes     Induction:       Indications for induction:        Augmentation: amniotomy;oxytocin     Indications for augmentation:       Labor complications: Fetal Intolerance     Additional complications:          Cervical ripening:                     Delivery:      Episiotomy:       Indication for Episiotomy:       Perineal Lacerations:   Repaired:      Periurethral Laceration:   Repaired:     Labial Laceration:   Repaired:     Sulcus Laceration:   Repaired:     Vaginal Laceration:   Repaired:     Cervical Laceration:   Repaired:     Repair suture:       Repair # of packets:       Last Value - EBL - Nursing (mL):       Sum - EBL - Nursing (mL): 400     Last Value - EBL - Anesthesia (mL): 400      Calculated QBL (mL):       Vaginal Sweep Performed:       [facility]  Correct:         Other providers:       Anesthesia    Method: Epidural  Analgesics: DEMEROL (PF) INJ          Details (if applicable):  Trial of Labor Yes    Categorization: Primary    Priority: Urgent   Indications for : Fetal heart rate abnormalities   Incision Type:       Additional  information:  Forceps:    Vacuum:    Breech:    Observed anomalies    Other (Comments):

## 2023-08-16 NOTE — ANESTHESIA POSTPROCEDURE EVALUATION
Anesthesia Post Evaluation    Patient: Shayy Mehta    Procedure(s) Performed: Procedure(s) (LRB):   SECTION (N/A)    Final Anesthesia Type: spinal      Patient location during evaluation: labor & delivery  Post-procedure vital signs: reviewed and stable  Pain management: adequate  Airway patency: patent  ANDRE mitigation strategies: Use of major conduction anesthesia (spinal/epidural) or peripheral nerve block  PONV status at discharge: No PONV  Anesthetic complications: no      Cardiovascular status: hemodynamically stable  Respiratory status: unassisted  Hydration status: euvolemic  Follow-up not needed.          Vitals Value Taken Time   /79 08/15/23 1900   Temp 36.3 °C (97.3 °F) 08/15/23 1747   Pulse 75 08/15/23 1901   Resp 18 08/15/23 1831   SpO2 98 % 08/15/23 1901   Vitals shown include unvalidated device data.      No case tracking events are documented in the log.      Pain/Gale Score: Pain Rating Prior to Med Admin: 8 (8/15/2023  6:31 PM)  Pain Rating Post Med Admin: 0 (8/15/2023 11:10 AM)

## 2023-08-16 NOTE — PLAN OF CARE
Problem: Adult Inpatient Plan of Care  Goal: Plan of Care Review  8/15/2023 1943 by Tammy Badillo RN  Outcome: Ongoing, Progressing  8/15/2023 1243 by Tammy Badillo RN  Outcome: Ongoing, Progressing  Goal: Patient-Specific Goal (Individualized)  8/15/2023 1943 by Tammy Badillo RN  Outcome: Ongoing, Progressing  8/15/2023 1243 by Tammy Badillo RN  Outcome: Ongoing, Progressing  Goal: Absence of Hospital-Acquired Illness or Injury  8/15/2023 1943 by Tammy Badillo RN  Outcome: Ongoing, Progressing  8/15/2023 1243 by Tammy Badillo RN  Outcome: Ongoing, Progressing  Goal: Optimal Comfort and Wellbeing  8/15/2023 1943 by Tammy Badillo RN  Outcome: Ongoing, Progressing  8/15/2023 1243 by Tammy Badillo RN  Outcome: Ongoing, Progressing  Goal: Readiness for Transition of Care  8/15/2023 1943 by Tammy Badillo RN  Outcome: Ongoing, Progressing  8/15/2023 1243 by Tammy Badillo RN  Outcome: Ongoing, Progressing     Problem:  Fall Injury Risk  Goal: Absence of Fall, Infant Drop and Related Injury  8/15/2023 1943 by Tammy Badillo RN  Outcome: Ongoing, Progressing  8/15/2023 1243 by Tammy Badillo RN  Outcome: Ongoing, Progressing     Problem: Infection  Goal: Absence of Infection Signs and Symptoms  8/15/2023 1943 by Tammy Badillo RN  Outcome: Ongoing, Progressing  8/15/2023 1243 by Tammy Badillo RN  Outcome: Ongoing, Progressing     Problem: Adjustment to Role Transition (Postpartum  Delivery)  Goal: Successful Maternal Role Transition  Outcome: Ongoing, Progressing     Problem: Bleeding (Postpartum  Delivery)  Goal: Hemostasis  Outcome: Ongoing, Progressing     Problem: Infection (Postpartum  Delivery)  Goal: Absence of Infection Signs and Symptoms  Outcome: Ongoing, Progressing     Problem: Pain (Postpartum  Delivery)  Goal: Acceptable Pain Control  Outcome: Ongoing, Progressing     Problem: Postoperative Nausea and  Vomiting (Postpartum  Delivery)  Goal: Nausea and Vomiting Relief  Outcome: Ongoing, Progressing     Problem: Postoperative Urinary Retention (Postpartum  Delivery)  Goal: Effective Urinary Elimination  Outcome: Ongoing, Progressing

## 2023-08-16 NOTE — SUBJECTIVE & OBJECTIVE
Interval History: POD#1    She is doing well this morning. She is tolerating a regular diet without nausea or vomiting. She is voiding spontaneously. She is ambulating. She has passed flatus, and has not a BM. Vaginal bleeding is mild. She denies fever or chills. Abdominal pain is mild and controlled with oral medications. She Is breastfeeding. She desires circumcision for her male baby: yes.    Objective:     Vital Signs (Most Recent):  Temp: 96.6 °F (35.9 °C) (08/16/23 0436)  Pulse: 75 (08/16/23 0436)  Resp: 18 (08/16/23 0436)  BP: 123/85 (08/16/23 0436)  SpO2: 99 % (08/15/23 2351) Vital Signs (24h Range):  Temp:  [96.6 °F (35.9 °C)-98.1 °F (36.7 °C)] 96.6 °F (35.9 °C)  Pulse:  [] 75  Resp:  [18-22] 18  SpO2:  [90 %-100 %] 99 %  BP: (100-162)/() 123/85     Weight: 81.8 kg (180 lb 6.4 oz)  Body mass index is 33 kg/m².      Intake/Output Summary (Last 24 hours) at 8/16/2023 0819  Last data filed at 8/16/2023 0622  Gross per 24 hour   Intake 500 ml   Output 3500 ml   Net -3000 ml         Significant Labs:  Lab Results   Component Value Date    GROUPTRH B POS 08/15/2023    HEPBSAG Non-Reactive 08/15/2023     Recent Labs   Lab 08/16/23  0419   HGB 11.0*   HCT 32.4*       I have personallly reviewed all pertinent lab results from the last 24 hours.  Recent Lab Results         08/16/23  0419   08/15/23  1856        Albumin/Globulin Ratio   0.6       Albumin   2.5       Alkaline Phosphatase   205       ALT   21       Anion Gap   14       AST   29       Baso # 0.05   0.03       Basophil % 0.4   0.2       BILIRUBIN TOTAL   0.7       BUN   5       BUN/CREAT RATIO   6       Calcium   9.1       Chloride   113       CO2   18       Creatinine   0.78       Differential Type Auto   Auto       eGFR   112       Eos # 0.01   0.00       Eosinophil % 0.1   0.0       Globulin, Total   4.0       Glucose   72       Hematocrit 32.4   36.4       Hemoglobin 11.0   12.4       Immature Grans (Abs) 0.10   0.09       Immature  Granulocytes 0.7   0.7       Lymph # 2.29   1.30       Lymph % 16.2   10.4       MCH 30.7   31.0       MCHC 34.0   34.1       MCV 90.5   91.0       Mono # 1.25   1.18       Mono % 8.9   9.5       MPV 10.7   11.0       Neutrophils, Abs 10.42   9.88       Neutrophils Relative 73.7   79.2       nRBC 0.0   0.0       NUCLEATED RBC ABSOLUTE 0.00   0.00       Platelets 169   204       Potassium   3.6       PROTEIN TOTAL   6.5       RBC 3.58   4.00       RDW 13.2   13.4       Sodium   141       Uric Acid   6.0       WBC 14.12   12.48               Physical Exam:   Constitutional: She is oriented to person, place, and time. She appears well-developed and well-nourished.    HENT:   Head: Normocephalic and atraumatic.    Eyes: Pupils are equal, round, and reactive to light. EOM are normal.     Cardiovascular:  Normal rate, regular rhythm and normal heart sounds.      Exam reveals no clubbing, no cyanosis and no edema.        Pulmonary/Chest: Effort normal and breath sounds normal.        Abdominal: Soft. Bowel sounds are normal. She exhibits abdominal incision. She exhibits no distension. There is abdominal tenderness.     Genitourinary:    Uterus normal.             Musculoskeletal: Normal range of motion and moves all extremeties.       Neurological: She is alert and oriented to person, place, and time.    Skin: Skin is warm and dry. No cyanosis. Nails show no clubbing.    Psychiatric: She has a normal mood and affect. Her behavior is normal. Judgment and thought content normal.       Review of Systems   Constitutional:  Negative for activity change, appetite change, fatigue and unexpected weight change.   HENT: Negative.     Respiratory:  Negative for cough, shortness of breath and wheezing.    Cardiovascular:  Negative for chest pain, palpitations and leg swelling.   Gastrointestinal:  Negative for abdominal pain, bloating, blood in stool, constipation, diarrhea, nausea, vomiting and reflux.   Genitourinary:  Negative for  bladder incontinence, decreased libido, dysmenorrhea, dyspareunia, dysuria, flank pain, frequency, menorrhagia, menstrual problem, pelvic pain, urgency, vaginal bleeding, vaginal discharge, postcoital bleeding and vaginal odor.   Musculoskeletal:  Negative for back pain.   Integumentary:  Negative for rash, acne, hair changes, mole/lesion, breast mass, nipple discharge, breast skin changes and breast tenderness.   Neurological:  Negative for headaches.   Psychiatric/Behavioral:  Negative for depression and sleep disturbance. The patient is not nervous/anxious.    Breast: Negative for asymmetry, breast self exam, lump, mass, nipple discharge, skin changes and tenderness

## 2023-08-16 NOTE — HOSPITAL COURSE
Patient is a 17-year-old  1 para 1 that presented at 40 and 3 7th weeks gestation with a complaint of contractions and spontaneous rupture of membranes.  It was noted that she had meconium-stained fluid.  Labor progress for the patient to be 6 cm.  She did experience fetal intolerance to labor and at that time it was proceeded to follow with a primary  section.  She had a primary low-transverse  section and delivered a male  weighing 7 lb 6.2 oz with Apgars 4 and 9.  She is breast and bottle feeding.  She is stable and is doing well.

## 2023-08-16 NOTE — PROGRESS NOTES
[facility] -  Labor and Delivery  Obstetrics  Postpartum Progress Note    Patient Name: Shayy Mehta  MRN: [id number]  Admission Date: 8/15/2023  Hospital Length of Stay: 1 days  Attending Physician: Jhonatan Briscoe MD  Primary Care Provider: Bonita Gao, LUZ, FNP    Subjective:     Principal Problem:Status post  delivery    Hospital Course:  No notes on file    Interval History: POD#1    She is doing well this morning. She is tolerating a regular diet without nausea or vomiting. She is voiding spontaneously. She is ambulating. She has passed flatus, and has not a BM. Vaginal bleeding is mild. She denies fever or chills. Abdominal pain is mild and controlled with oral medications. She Is breastfeeding. She desires circumcision for her male baby: yes.    Objective:     Vital Signs (Most Recent):  Temp: 96.6 °F (35.9 °C) (23 043)  Pulse: 75 (23)  Resp: 18 (23)  BP: 123/85 (23)  SpO2: 99 % (08/15/23 2351) Vital Signs (24h Range):  Temp:  [96.6 °F (35.9 °C)-98.1 °F (36.7 °C)] 96.6 °F (35.9 °C)  Pulse:  [] 75  Resp:  [18-22] 18  SpO2:  [90 %-100 %] 99 %  BP: (100-162)/() 123/85     Weight: 81.8 kg (180 lb 6.4 oz)  Body mass index is 33 kg/m².      Intake/Output Summary (Last 24 hours) at 2023 0819  Last data filed at 2023 0622  Gross per 24 hour   Intake 500 ml   Output 3500 ml   Net -3000 ml         Significant Labs:  Lab Results   Component Value Date    GROUPTRH B POS 08/15/2023    HEPBSAG Non-Reactive 08/15/2023     Recent Labs   Lab 23   HGB 11.0*   HCT 32.4*       I have personallly reviewed all pertinent lab results from the last 24 hours.  Recent Lab Results         08/16/23  0419   08/15/23  1856        Albumin/Globulin Ratio   0.6       Albumin   2.5       Alkaline Phosphatase   205       ALT   21       Anion Gap   14       AST   29       Baso # 0.05   0.03       Basophil % 0.4   0.2       BILIRUBIN TOTAL    0.7       BUN   5       BUN/CREAT RATIO   6       Calcium   9.1       Chloride   113       CO2   18       Creatinine   0.78       Differential Type Auto   Auto       eGFR   112       Eos # 0.01   0.00       Eosinophil % 0.1   0.0       Globulin, Total   4.0       Glucose   72       Hematocrit 32.4   36.4       Hemoglobin 11.0   12.4       Immature Grans (Abs) 0.10   0.09       Immature Granulocytes 0.7   0.7       Lymph # 2.29   1.30       Lymph % 16.2   10.4       MCH 30.7   31.0       MCHC 34.0   34.1       MCV 90.5   91.0       Mono # 1.25   1.18       Mono % 8.9   9.5       MPV 10.7   11.0       Neutrophils, Abs 10.42   9.88       Neutrophils Relative 73.7   79.2       nRBC 0.0   0.0       NUCLEATED RBC ABSOLUTE 0.00   0.00       Platelets 169   204       Potassium   3.6       PROTEIN TOTAL   6.5       RBC 3.58   4.00       RDW 13.2   13.4       Sodium   141       Uric Acid   6.0       WBC 14.12   12.48               Physical Exam:   Constitutional: She is oriented to person, place, and time. She appears well-developed and well-nourished.    HENT:   Head: Normocephalic and atraumatic.    Eyes: Pupils are equal, round, and reactive to light. EOM are normal.     Cardiovascular:  Normal rate, regular rhythm and normal heart sounds.      Exam reveals no clubbing, no cyanosis and no edema.        Pulmonary/Chest: Effort normal and breath sounds normal.        Abdominal: Soft. Bowel sounds are normal. She exhibits abdominal incision. She exhibits no distension. There is abdominal tenderness.     Genitourinary:    Uterus normal.             Musculoskeletal: Normal range of motion and moves all extremeties.       Neurological: She is alert and oriented to person, place, and time.    Skin: Skin is warm and dry. No cyanosis. Nails show no clubbing.    Psychiatric: She has a normal mood and affect. Her behavior is normal. Judgment and thought content normal.       Review of Systems   Constitutional:  Negative for activity  change, appetite change, fatigue and unexpected weight change.   HENT: Negative.     Respiratory:  Negative for cough, shortness of breath and wheezing.    Cardiovascular:  Negative for chest pain, palpitations and leg swelling.   Gastrointestinal:  Negative for abdominal pain, bloating, blood in stool, constipation, diarrhea, nausea, vomiting and reflux.   Genitourinary:  Negative for bladder incontinence, decreased libido, dysmenorrhea, dyspareunia, dysuria, flank pain, frequency, menorrhagia, menstrual problem, pelvic pain, urgency, vaginal bleeding, vaginal discharge, postcoital bleeding and vaginal odor.   Musculoskeletal:  Negative for back pain.   Integumentary:  Negative for rash, acne, hair changes, mole/lesion, breast mass, nipple discharge, breast skin changes and breast tenderness.   Neurological:  Negative for headaches.   Psychiatric/Behavioral:  Negative for depression and sleep disturbance. The patient is not nervous/anxious.    Breast: Negative for asymmetry, breast self exam, lump, mass, nipple discharge, skin changes and tenderness      Assessment/Plan:     17 y.o. female  for:    * Status post  delivery  Routine postpartum/ postop care        Disposition: As patient meets milestones, will plan to discharge 1-2 days.    Jhonatan Briscoe MD  Obstetrics  [facility] -  Labor and Delivery

## 2023-08-16 NOTE — PLAN OF CARE
Problem: Adult Inpatient Plan of Care  Goal: Plan of Care Review  2023 by Tammy Badillo RN  Outcome: Ongoing, Progressing  8/15/2023 1943 by Tammy Badillo RN  Outcome: Ongoing, Progressing  Goal: Patient-Specific Goal (Individualized)  2023 by Tammy Badillo RN  Outcome: Ongoing, Progressing  8/15/2023 1943 by Tammy Badillo RN  Outcome: Ongoing, Progressing  Goal: Absence of Hospital-Acquired Illness or Injury  2023 by Tammy Badillo RN  Outcome: Ongoing, Progressing  8/15/2023 1943 by Tammy Badillo RN  Outcome: Ongoing, Progressing  Goal: Optimal Comfort and Wellbeing  2023 by Tammy Badillo RN  Outcome: Ongoing, Progressing  8/15/2023 1943 by Tammy Badillo RN  Outcome: Ongoing, Progressing  Goal: Readiness for Transition of Care  2023 by Tammy Badillo RN  Outcome: Ongoing, Progressing  8/15/2023 1943 by Tammy Badillo RN  Outcome: Ongoing, Progressing     Problem:  Fall Injury Risk  Goal: Absence of Fall, Infant Drop and Related Injury  2023 by Tammy Badillo RN  Outcome: Ongoing, Progressing  8/15/2023 1943 by Tammy Badillo RN  Outcome: Ongoing, Progressing     Problem: Bleeding (Labor)  Goal: Hemostasis  Outcome: Met     Problem: Change in Fetal Wellbeing (Labor)  Goal: Stable Fetal Wellbeing  Outcome: Met     Problem: Delayed Labor Progression (Labor)  Goal: Effective Progression to Delivery  Outcome: Met     Problem: Infection (Labor)  Goal: Absence of Infection Signs and Symptoms  Outcome: Met     Problem: Labor Pain (Labor)  Goal: Acceptable Pain Control  Outcome: Met     Problem: Uterine Tachysystole (Labor)  Goal: Normal Uterine Contraction Pattern  Outcome: Met     Problem: Infection  Goal: Absence of Infection Signs and Symptoms  2023 by Tammy Badillo RN  Outcome: Ongoing, Progressing  8/15/2023 1943 by Tammy Badillo RN  Outcome: Ongoing, Progressing      Problem: Adjustment to Role Transition (Postpartum  Delivery)  Goal: Successful Maternal Role Transition  2023 0834 by Tammy Badillo RN  Outcome: Ongoing, Progressing  8/15/2023 1943 by Tammy Badillo RN  Outcome: Ongoing, Progressing     Problem: Bleeding (Postpartum  Delivery)  Goal: Hemostasis  2023 0834 by Tammy Badillo RN  Outcome: Ongoing, Progressing  8/15/2023 1943 by Tammy Badillo RN  Outcome: Ongoing, Progressing     Problem: Infection (Postpartum  Delivery)  Goal: Absence of Infection Signs and Symptoms  2023 0834 by Tammy Badillo RN  Outcome: Ongoing, Progressing  8/15/2023 1943 by Tammy Badillo RN  Outcome: Ongoing, Progressing     Problem: Pain (Postpartum  Delivery)  Goal: Acceptable Pain Control  2023 08 by Tammy Badillo RN  Outcome: Ongoing, Progressing  8/15/2023 1943 by Tammy Badillo RN  Outcome: Ongoing, Progressing     Problem: Postoperative Nausea and Vomiting (Postpartum  Delivery)  Goal: Nausea and Vomiting Relief  2023 08 by Tammy Badillo RN  Outcome: Ongoing, Progressing  8/15/2023 1943 by Tammy Badillo RN  Outcome: Ongoing, Progressing     Problem: Postoperative Urinary Retention (Postpartum  Delivery)  Goal: Effective Urinary Elimination  2023 08 by Tammy Badillo RN  Outcome: Ongoing, Progressing  8/15/2023 1943 by Tammy Badillo RN  Outcome: Ongoing, Progressing

## 2023-08-16 NOTE — PLAN OF CARE
Problem: Bleeding (Labor)  Goal: Hemostasis  8/15/2023 1943 by Tammy Badillo, RN  Outcome: Met  8/15/2023 1243 by Tammy Badillo RN  Outcome: Ongoing, Progressing     Problem: Change in Fetal Wellbeing (Labor)  Goal: Stable Fetal Wellbeing  8/15/2023 1943 by Tammy Badillo, RN  Outcome: Met  8/15/2023 1243 by Tammy Badillo, RN  Outcome: Ongoing, Progressing     Problem: Delayed Labor Progression (Labor)  Goal: Effective Progression to Delivery  8/15/2023 1943 by Tammy Badillo, RN  Outcome: Met  8/15/2023 1243 by Tammy Badillo, RN  Outcome: Ongoing, Progressing     Problem: Infection (Labor)  Goal: Absence of Infection Signs and Symptoms  8/15/2023 1943 by Tammy Badillo, RN  Outcome: Met  8/15/2023 1243 by Tammy Badillo, RN  Outcome: Ongoing, Progressing     Problem: Labor Pain (Labor)  Goal: Acceptable Pain Control  8/15/2023 1943 by Tammy Badillo, RN  Outcome: Met  8/15/2023 1243 by Tammy Badillo, RN  Outcome: Ongoing, Progressing     Problem: Uterine Tachysystole (Labor)  Goal: Normal Uterine Contraction Pattern  8/15/2023 1943 by Tammy Badillo, RN  Outcome: Met  8/15/2023 1243 by Tammy Badillo, RN  Outcome: Ongoing, Progressing

## 2023-08-17 VITALS
RESPIRATION RATE: 18 BRPM | TEMPERATURE: 98 F | HEIGHT: 62 IN | WEIGHT: 180.38 LBS | BODY MASS INDEX: 33.19 KG/M2 | HEART RATE: 68 BPM | DIASTOLIC BLOOD PRESSURE: 63 MMHG | OXYGEN SATURATION: 99 % | SYSTOLIC BLOOD PRESSURE: 137 MMHG

## 2023-08-17 PROCEDURE — 25000003 PHARM REV CODE 250: Performed by: OBSTETRICS & GYNECOLOGY

## 2023-08-17 RX ORDER — OXYCODONE AND ACETAMINOPHEN 5; 325 MG/1; MG/1
1 TABLET ORAL EVERY 6 HOURS PRN
Qty: 25 TABLET | Refills: 0 | Status: SHIPPED | OUTPATIENT
Start: 2023-08-17 | End: 2023-11-16

## 2023-08-17 RX ORDER — IBUPROFEN 800 MG/1
800 TABLET ORAL EVERY 8 HOURS PRN
Qty: 60 TABLET | Refills: 1 | Status: SHIPPED | OUTPATIENT
Start: 2023-08-17 | End: 2023-11-16

## 2023-08-17 RX ORDER — OXYCODONE AND ACETAMINOPHEN 10; 325 MG/1; MG/1
TABLET ORAL
Status: DISCONTINUED
Start: 2023-08-17 | End: 2023-08-17 | Stop reason: HOSPADM

## 2023-08-17 RX ADMIN — OXYCODONE AND ACETAMINOPHEN 1 TABLET: 10; 325 TABLET ORAL at 01:08

## 2023-08-17 RX ADMIN — Medication 1 TABLET: at 09:08

## 2023-08-17 RX ADMIN — IBUPROFEN 800 MG: 800 TABLET, FILM COATED ORAL at 05:08

## 2023-08-17 RX ADMIN — OXYCODONE AND ACETAMINOPHEN 1 TABLET: 10; 325 TABLET ORAL at 05:08

## 2023-08-17 RX ADMIN — DOCUSATE SODIUM 200 MG: 100 CAPSULE, LIQUID FILLED ORAL at 09:08

## 2023-08-17 RX ADMIN — BISACODYL 10 MG: 10 SUPPOSITORY RECTAL at 09:08

## 2023-08-17 NOTE — DISCHARGE SUMMARY
[facility] -  Labor and Delivery  Obstetrics  Discharge Summary      Patient Name: Shayy Mehta  MRN: [id number]  Admission Date: 8/15/2023  Hospital Length of Stay: 2 days  Discharge Date and Time:  2023 8:26 AM  Attending Physician: Jhonatan Briscoe MD   Discharging Provider: Helen Hoff CNM   Primary Care Provider: Bonita aGo, LUZ, FNP    HPI: Patient is a 17-year-old  1 para 0 presents at 40 weeks and 3 days with complaints of uterine contractions t her provider the colpo hat are occurring every 3-4 minutes apart.  Patient is grossly ruptured and 3-4 cm 70% and-3 station.  She reports that her water broke at 1:30 a.m..  She is being admitted at this time to her provider Helen Hoff CNM..  Patient is GBS positive and is receiving ampicillin at this time.  Spontaneous vaginal delivery is anticipated.          Procedure(s) (LRB):   SECTION (N/A)     Hospital Course:   Patient is a 17-year-old  1 para 1 that presented at 40 and 3 7th weeks gestation with a complaint of contractions and spontaneous rupture of membranes.  It was noted that she had meconium-stained fluid.  Labor progress for the patient to be 6 cm.  She did experience fetal intolerance to labor and at that time it was proceeded to follow with a primary  section.  She had a primary low-transverse  section and delivered a male  weighing 7 lb 6.2 oz with Apgars 4 and 9.  She is breast and bottle feeding.  She is stable and is doing well.       Consults (From admission, onward)        Status Ordering Provider     Inpatient consult to Anesthesiology  Once        Provider:  (Not yet assigned)    Acknowledged ED MORALES          Final Active Diagnoses:    Diagnosis Date Noted POA    PRINCIPAL PROBLEM:  Status post  delivery [Z98.891] 2023 Not Applicable    40 weeks gestation of pregnancy [Z3A.40] 08/15/2023 Not Applicable    Delayed delivery after SROM  "(spontaneous rupture of membranes) [O42.90] 08/15/2023 Yes    Positive GBS test [B95.1] 08/15/2023 Yes      Problems Resolved During this Admission:        Significant Diagnostic Studies: Labs:   CBC   Recent Labs   Lab 08/15/23  1856 23  0419   WBC 12.48* 14.12*   HGB 12.4 11.0*   HCT 36.4* 32.4*    169    and All labs within the past 24 hours have been reviewed      Feeding Method: both breast and bottle    Immunizations     Date Immunization Status Dose Route/Site Given by    08/15/23 1907 MMR Incomplete 0.5 mL Subcutaneous/     08/15/23 1907 Tdap Incomplete 0.5 mL Intramuscular/           Delivery:    Episiotomy:     Lacerations:     Repair suture:     Repair # of packets:     Blood loss (ml):       Birth information:  YOB: 2025   Time of birth: 5:11 PM   Sex: male   Delivery type: , Low Transverse   Gestational Age: 40w3d     Measurements    Weight: 3352 g  Weight (lbs): 7 lb 6.2 oz  Length: 50.8 cm  Length (in): 20"         Delivery Clinician: Delivery Providers    Delivering clinician: Jhonatan Briscoe MD   Provider Role    Helen Hoff CNM            Additional  information:  Forceps:    Vacuum:    Breech:    Observed anomalies      Living?:     Apgars    Living status: Living  Apgar Component Scores:  1 min.:  5 min.:  10 min.:  15 min.:  20 min.:    Skin color:  0  1       Heart rate:  2  2       Reflex irritability:  1  2       Muscle tone:  0  2       Respiratory effort:  1  2       Total:  4  9       Apgars assigned by: LUIS UMAÑA         Placenta: Delivered:       appearance    Pending Diagnostic Studies:     None          Discharged Condition: good    Disposition: Home or Self Care    Follow Up:   Follow-up Information     Helen Hoff CNM Follow up in 2 week(s).    Specialty: Obstetrics and Gynecology  Why: Postpartum Visit  Contact information:  [address]  [facility] [address]  [phone]                       Patient Instructions:      Diet " Adult Regular     Lifting restrictions   Order Comments: Don't lift anything heavier than your baby     No driving until:   Order Comments: Until seen by provider     Pelvic Rest   Order Comments: No sex, nothing in vagina until after 7-8 weeks post operation     No dressing needed   Order Comments: Keep incision clean and dry     Notify your health care provider if you experience any of the following:  temperature >100.4     Notify your health care provider if you experience any of the following:  persistent nausea and vomiting or diarrhea     Notify your health care provider if you experience any of the following:  severe uncontrolled pain     Notify your health care provider if you experience any of the following:  redness, tenderness, or signs of infection (pain, swelling, redness, odor or green/yellow discharge around incision site)     Notify your health care provider if you experience any of the following:  difficulty breathing or increased cough     Notify your health care provider if you experience any of the following:  severe persistent headache     Notify your health care provider if you experience any of the following:  worsening rash     Notify your health care provider if you experience any of the following:  persistent dizziness, light-headedness, or visual disturbances     Notify your health care provider if you experience any of the following:  increased confusion or weakness     Notify your health care provider if you experience any of the following:     Activity as tolerated     Medications:  Current Discharge Medication List      START taking these medications    Details   ibuprofen (ADVIL,MOTRIN) 800 MG tablet Take 1 tablet (800 mg total) by mouth every 8 (eight) hours as needed.  Qty: 60 tablet, Refills: 1      oxyCODONE-acetaminophen (PERCOCET) 5-325 mg per tablet Take 1 tablet by mouth every 6 (six) hours as needed for Pain.  Qty: 25 tablet, Refills: 0    Comments: n/a          CONTINUE  these medications which have NOT CHANGED    Details   ferrous sulfate 325 (65 FE) MG EC tablet Take 1 tablet (325 mg total) by mouth 2 (two) times daily.  Qty: 30 tablet, Refills: 4    Associated Diagnoses: Anemia, unspecified type         STOP taking these medications       aspirin (ECOTRIN) 81 MG EC tablet Comments:   Reason for Stopping:         NIFEdipine (PROCARDIA) 10 MG Cap Comments:   Reason for Stopping:         ondansetron (ZOFRAN-ODT) 8 MG TbDL Comments:   Reason for Stopping:               Helen Hoff CNM  Obstetrics  [facility] -  Labor and Delivery

## 2023-08-17 NOTE — PLAN OF CARE
Problem: Adult Inpatient Plan of Care  Goal: Plan of Care Review  2023 by Tammy Badillo, RN  Outcome: Met  2023 0810 by Tammy Badillo RN  Outcome: Ongoing, Progressing  Goal: Patient-Specific Goal (Individualized)  2023 by Tammy Badillo, RN  Outcome: Met  2023 0810 by Tammy Badillo RN  Outcome: Ongoing, Progressing  Goal: Absence of Hospital-Acquired Illness or Injury  2023 by Tammy Badillo, RN  Outcome: Met  2023 0810 by Tammy Badillo, RN  Outcome: Ongoing, Progressing  Goal: Optimal Comfort and Wellbeing  2023 by Tammy Badillo, RN  Outcome: Met  2023 0810 by Tammy Badillo RN  Outcome: Ongoing, Progressing  Goal: Readiness for Transition of Care  2023 by Tammy Badillo, RN  Outcome: Met  2023 0810 by Tammy Badillo RN  Outcome: Ongoing, Progressing     Problem:  Fall Injury Risk  Goal: Absence of Fall, Infant Drop and Related Injury  2023 by Tammy Badillo, RN  Outcome: Met  2023 0810 by Tammy Badillo RN  Outcome: Ongoing, Progressing     Problem: Infection  Goal: Absence of Infection Signs and Symptoms  2023 by Tammy Badillo, RN  Outcome: Met  2023 0810 by Tammy Badillo RN  Outcome: Ongoing, Progressing     Problem: Adjustment to Role Transition (Postpartum  Delivery)  Goal: Successful Maternal Role Transition  2023 by Tammy Badillo, RN  Outcome: Met  2023 0810 by Tammy Badillo RN  Outcome: Ongoing, Progressing     Problem: Bleeding (Postpartum  Delivery)  Goal: Hemostasis  2023 by Tammy Badillo, RN  Outcome: Met  2023 0810 by Tammy Badillo RN  Outcome: Ongoing, Progressing     Problem: Infection (Postpartum  Delivery)  Goal: Absence of Infection Signs and Symptoms  2023 1015 by Tammy Badillo, RN  Outcome: Met  2023 0810 by Tammy Badillo, RN  Outcome:  Ongoing, Progressing     Problem: Pain (Postpartum  Delivery)  Goal: Acceptable Pain Control  2023 1015 by Tammy Badillo RN  Outcome: Met  2023 0810 by Tammy Badillo RN  Outcome: Ongoing, Progressing     Problem: Postoperative Nausea and Vomiting (Postpartum  Delivery)  Goal: Nausea and Vomiting Relief  2023 1015 by Tammy Badillo, RN  Outcome: Met  2023 0810 by Tammy Badillo RN  Outcome: Ongoing, Progressing     Problem: Postoperative Urinary Retention (Postpartum  Delivery)  Goal: Effective Urinary Elimination  2023 1015 by Tammy Badillo RN  Outcome: Met  2023 0810 by Tammy Badillo RN  Outcome: Ongoing, Progressing
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
## 2023-08-17 NOTE — PLAN OF CARE
[facility] -  Labor and Delivery  Discharge Final Note    Primary Care Provider: Bonita Gao, LUZ, FNP    Expected Discharge Date: 8/17/2023    Final Discharge Note (most recent)       Final Note - 08/17/23 1336          Final Note    Assessment Type Final Discharge Note     Anticipated Discharge Disposition Home or Self Care                     Important Message from Medicare             Contact Info       Helen Hoff CNM   Specialty: Obstetrics and Gynecology    [address] [place] [address]   Phone: [phone]       Next Steps: Follow up in 2 week(s)    Instructions: Postpartum Visit          Christine from cps called. She will follow up with patient and infant at home.

## 2023-08-18 NOTE — PLAN OF CARE
Received call from Camila at [facility] cps. [phone]. They are following up with mother and infant at home.

## 2023-09-13 ENCOUNTER — POSTPARTUM VISIT (OUTPATIENT)
Dept: OBSTETRICS AND GYNECOLOGY | Facility: CLINIC | Age: 18
End: 2023-09-13
Payer: MEDICAID

## 2023-09-13 VITALS
HEIGHT: 62 IN | SYSTOLIC BLOOD PRESSURE: 125 MMHG | TEMPERATURE: 98 F | WEIGHT: 150.81 LBS | DIASTOLIC BLOOD PRESSURE: 82 MMHG | OXYGEN SATURATION: 98 % | HEART RATE: 64 BPM | BODY MASS INDEX: 27.75 KG/M2

## 2023-09-13 DIAGNOSIS — Z30.9 ENCOUNTER FOR CONTRACEPTIVE MANAGEMENT, UNSPECIFIED TYPE: ICD-10-CM

## 2023-09-13 PROCEDURE — 1111F PR DISCHARGE MEDS RECONCILED W/ CURRENT OUTPATIENT MED LIST: ICD-10-PCS | Mod: CPTII,,, | Performed by: ADVANCED PRACTICE MIDWIFE

## 2023-09-13 PROCEDURE — 0503F POSTPARTUM CARE VISIT: CPT | Mod: ,,, | Performed by: ADVANCED PRACTICE MIDWIFE

## 2023-09-13 PROCEDURE — 0503F PR POSTPARTUM CARE VISIT: ICD-10-PCS | Mod: ,,, | Performed by: ADVANCED PRACTICE MIDWIFE

## 2023-09-13 PROCEDURE — 1111F DSCHRG MED/CURRENT MED MERGE: CPT | Mod: CPTII,,, | Performed by: ADVANCED PRACTICE MIDWIFE

## 2023-09-13 RX ORDER — SERTRALINE HYDROCHLORIDE 25 MG/1
25 TABLET, FILM COATED ORAL DAILY
Qty: 30 TABLET | Refills: 3 | Status: SHIPPED | OUTPATIENT
Start: 2023-09-13 | End: 2023-11-16

## 2023-09-13 NOTE — PROGRESS NOTES
"CC: Post-partum follow-up    Shayy Mehta is a 17 y.o. female  who presents for post-partum visit.  She is S/P a , due to fetal intolerance to labor .  She and the baby are doing well.  No pain.  No fever.   No bowel / bladder complaints.    Delivery Date: August 15, 2023  Delivery MD: Dr. Murdock  Gender: male  Birth Weight: 7 pounds 6 ounces  Breast Feeding: YES  Depression: Yes. States appetite is poor "I barely eat at all". Sleep- "I barely get sleep. I don't go to sleep at night time or any time". States "cries a lot". Mother helps with the baby. States "I feel like I don't want to be alive like if I was to  die today, I die. If something happens to me it just happens. I don't want to harm myself or kill myself". Denies any hallucinations, delusions or illusions.   Contraception: IUD    Pregnancy was complicated by:  Teen pregnancy    /82   Pulse 64   Temp 98.3 °F (36.8 °C)   Ht 5' 2" (1.575 m)   Wt 68.4 kg (150 lb 12.8 oz)   LMP 2022 (Exact Date)   SpO2 98%   Breastfeeding No   BMI 27.58 kg/m²     ROS:  GENERAL: No fever, chills, fatigability.  VULVAR: No pain, no lesions and no itching.  VAGINAL: No relaxation, no itching, no discharge, no abnormal bleeding and no lesions.  ABDOMEN: No abdominal pain. Denies nausea. Denies vomiting. No diarrhea. No constipation  BREAST: Denies pain. No lumps. No discharge.  URINARY: No incontinence, no nocturia, no frequency and no dysuria.  CARDIOVASCULAR: No chest pain. No shortness of breath. No leg cramps.  NEUROLOGICAL: No headaches. No vision changes.    PHYSICAL EXAM:  ABDOMEN:  Soft, non-tender, non-distended. Incision healed well  VULVA:  Normal, no lesions  CERVIX:  Without lesions, polyps or tenderness.  UTERUS:  Normal size, shape, consistency, no mass or tenderness.  ADNEXA:  Normal in size without mass or tenderness    IMP:  4 Weeks Postpartum  Status Post , due to fetal intolerance to labor  Postpartum " exam    Encounter for contraceptive management, unspecified type  -     Device Authorization Order    Postpartum depression  -     sertraline (ZOLOFT) 25 MG tablet; Take 1 tablet (25 mg total) by mouth once daily.  Dispense: 30 tablet; Refill: 3        ICD-10-CM ICD-9-CM    1. Postpartum exam  Z39.2 V24.2       2. Encounter for contraceptive management, unspecified type  Z30.9 V25.9 Device Authorization Order      3. Postpartum depression  F53.0 648.44 sertraline (ZOLOFT) 25 MG tablet     311           PLAN:  May resume normal activities after 8 weeks  May return to school/work after 8 weeks postpartum   Counseling and therapy recommended- states will f/u @ [facility] today  Zoloft start today  F/u next week for kyleena insertion  Birth control options and counseling discussed  Verbalized understanding to all information and instructions  Questions answered to desired level of satisfaction    Follow up in about 12 days (around 9/25/2023), or if symptoms worsen or fail to improve, for IUD insertion.

## 2023-09-18 NOTE — PROCEDURES
Procedures  OB ultrasound Note:    BPD- 38 week 5 day  HC-39 week 2 day  AC- 37 week 6 day  FL- 38 weeks 0 day    TOYA- 11.13 cm    Fetal heart rate: 142 bpm    Fetal position- vertex  Placental location- posterior    Impression-    RUSSEL 8/18/2023  Estimated gestational age 38w3d  EFW 3417g (7# 8oz)  Pctl ( EFW) 60th percentile      Biophysical Profile:    Fetal Movements- 2  Fetal breathing- 2  Fetal Tone- 2  Amniotic Fluid Volume- 2    Total - 8

## 2023-09-22 NOTE — PROCEDURES
Procedures  OB ultrasound Note:    BPD- 39 week 3 day  HC-41 weeks 0 day  AC- 38 week 4 day  FL- 37 week 2 day    TOYA- 8.18 cm    Fetal heart rate:  143 bpm    Fetal position- vertex  Placental location- posterior    Impression-    RUSSEL August 20, 2023  Estimated gestational age 39 weeks 1 day  EFW 1549 g (7 lb 13 oz)  Pctl ( EFW) 63rd th percentile      Biophysical Profile:    Fetal Movements- 2  Fetal breathing- 2  Fetal Tone- 2  Amniotic Fluid Volume- 2    Total - 8

## 2023-11-14 ENCOUNTER — OFFICE VISIT (OUTPATIENT)
Dept: FAMILY MEDICINE | Facility: CLINIC | Age: 18
End: 2023-11-14
Payer: MEDICAID

## 2023-11-14 VITALS
HEIGHT: 62 IN | OXYGEN SATURATION: 98 % | DIASTOLIC BLOOD PRESSURE: 85 MMHG | HEART RATE: 101 BPM | WEIGHT: 153 LBS | TEMPERATURE: 99 F | SYSTOLIC BLOOD PRESSURE: 136 MMHG | BODY MASS INDEX: 28.16 KG/M2 | RESPIRATION RATE: 18 BRPM

## 2023-11-14 DIAGNOSIS — A60.04 HERPES SIMPLEX VULVOVAGINITIS: Primary | ICD-10-CM

## 2023-11-14 DIAGNOSIS — R30.0 DYSURIA: ICD-10-CM

## 2023-11-14 PROCEDURE — 3075F SYST BP GE 130 - 139MM HG: CPT | Mod: CPTII,,, | Performed by: NURSE PRACTITIONER

## 2023-11-14 PROCEDURE — 3008F PR BODY MASS INDEX (BMI) DOCUMENTED: ICD-10-PCS | Mod: CPTII,,, | Performed by: NURSE PRACTITIONER

## 2023-11-14 PROCEDURE — 3079F DIAST BP 80-89 MM HG: CPT | Mod: CPTII,,, | Performed by: NURSE PRACTITIONER

## 2023-11-14 PROCEDURE — 3008F BODY MASS INDEX DOCD: CPT | Mod: CPTII,,, | Performed by: NURSE PRACTITIONER

## 2023-11-14 PROCEDURE — 1159F MED LIST DOCD IN RCRD: CPT | Mod: CPTII,,, | Performed by: NURSE PRACTITIONER

## 2023-11-14 PROCEDURE — 3079F PR MOST RECENT DIASTOLIC BLOOD PRESSURE 80-89 MM HG: ICD-10-PCS | Mod: CPTII,,, | Performed by: NURSE PRACTITIONER

## 2023-11-14 PROCEDURE — 3075F PR MOST RECENT SYSTOLIC BLOOD PRESS GE 130-139MM HG: ICD-10-PCS | Mod: CPTII,,, | Performed by: NURSE PRACTITIONER

## 2023-11-14 PROCEDURE — 1160F PR REVIEW ALL MEDS BY PRESCRIBER/CLIN PHARMACIST DOCUMENTED: ICD-10-PCS | Mod: CPTII,,, | Performed by: NURSE PRACTITIONER

## 2023-11-14 PROCEDURE — 99214 OFFICE O/P EST MOD 30 MIN: CPT | Mod: ,,, | Performed by: NURSE PRACTITIONER

## 2023-11-14 PROCEDURE — 99214 PR OFFICE/OUTPT VISIT, EST, LEVL IV, 30-39 MIN: ICD-10-PCS | Mod: ,,, | Performed by: NURSE PRACTITIONER

## 2023-11-14 PROCEDURE — 1160F RVW MEDS BY RX/DR IN RCRD: CPT | Mod: CPTII,,, | Performed by: NURSE PRACTITIONER

## 2023-11-14 PROCEDURE — 1159F PR MEDICATION LIST DOCUMENTED IN MEDICAL RECORD: ICD-10-PCS | Mod: CPTII,,, | Performed by: NURSE PRACTITIONER

## 2023-11-14 RX ORDER — VALACYCLOVIR HYDROCHLORIDE 500 MG/1
500 TABLET, FILM COATED ORAL 3 TIMES DAILY
Qty: 21 TABLET | Refills: 1 | Status: SHIPPED | OUTPATIENT
Start: 2023-11-14 | End: 2024-02-29 | Stop reason: SDUPTHER

## 2023-11-14 NOTE — PROGRESS NOTES
Bonita Gao DNP, FNP    [address] [place] [address]     PATIENT NAME: Shayy Mehta  : 2005  DATE: 23  MRN: [id number]      Billing Provider: Bonita Gao DNP, FNP  Level of Service:   Patient PCP Information       Provider PCP Type    Bonita Gao DNP, FNP General            Reason for Visit / Chief Complaint: Vaginal Itching, Dysuria, and Vaginal Discharge (Complains of discharge, odor, and painful urination starting last Thursday. States she is sexually active with multiple male partners.)       Update PCP  Update Chief Complaint         History of Present Illness / Problem Focused Workflow     Shayy Mehta presents to the clinic with Vaginal Itching, Dysuria, and Vaginal Discharge (Complains of discharge, odor, and painful urination starting last Thursday. States she is sexually active with multiple male partners.)   Pt states she has had + HSV testing.   Vaginal Itching  The patient's primary symptoms include vaginal discharge. Associated symptoms include dysuria. Pertinent negatives include no abdominal pain, back pain, chills, constipation, diarrhea, fever, headaches, nausea, rash, sore throat or vomiting.   Dysuria   Pertinent negatives include no chills, nausea, vomiting, constipation or rash.   Vaginal Discharge  The patient's primary symptoms include vaginal discharge. Associated symptoms include dysuria. Pertinent negatives include no abdominal pain, back pain, chills, constipation, diarrhea, fever, headaches, nausea, rash, sore throat or vomiting.       Review of Systems     Review of Systems   Constitutional:  Negative for activity change, appetite change, chills, fatigue and fever.   HENT:  Negative for nasal congestion, ear pain, hearing loss, postnasal drip and sore throat.    Respiratory:  Negative for cough, chest tightness, shortness of breath and wheezing.    Cardiovascular:  Negative for chest pain, palpitations, leg  "swelling and claudication.   Gastrointestinal:  Negative for abdominal pain, change in bowel habit, constipation, diarrhea, nausea and vomiting.   Genitourinary:  Positive for dysuria, vaginal discharge and vaginal pain.   Musculoskeletal:  Negative for arthralgias, back pain, gait problem and myalgias.   Integumentary:  Negative for rash.   Neurological:  Negative for weakness and headaches.   Psychiatric/Behavioral:  Negative for suicidal ideas. The patient is not nervous/anxious.         Medical / Social / Family History     Past Medical History:   Diagnosis Date    Asthma        Past Surgical History:   Procedure Laterality Date     SECTION N/A 8/15/2023    Procedure:  SECTION;  Surgeon: Jhonatan Briscoe MD;  Location: [facility] L&D;  Service: OB/GYN;  Laterality: N/A;    WISDOM TOOTH EXTRACTION         Social History  Ms. Shayy Mehta  reports that she has never smoked. She has never been exposed to tobacco smoke. She has never used smokeless tobacco. She reports that she does not drink alcohol and does not use drugs.    Family History  Ms. Shayy Mehta's family history includes Breast cancer in an other family member; Diabetes in her paternal grandmother and another family member; Hypertension in an other family member.    Medications and Allergies     Medications  No outpatient medications have been marked as taking for the 23 encounter (Office Visit) with Bonita Gao, LUZ, JESUSP.       Allergies  Review of patient's allergies indicates:  No Known Allergies    Physical Examination     Vitals:    23 0929   BP: 136/85   BP Location: Right arm   Patient Position: Sitting   BP Method: Medium (Automatic)   Pulse: 101   Resp: 18   Temp: 98.8 °F (37.1 °C)   TempSrc: Oral   SpO2: 98%   Weight: 69.4 kg (153 lb)   Height: 5' 2" (1.575 m)     Physical Exam  Vitals and nursing note reviewed.   Constitutional:       General: She is not in acute distress.     Appearance: Normal " appearance. She is not ill-appearing.   Eyes:      Extraocular Movements: Extraocular movements intact.      Pupils: Pupils are equal, round, and reactive to light.   Cardiovascular:      Rate and Rhythm: Normal rate and regular rhythm.      Heart sounds: Normal heart sounds.   Pulmonary:      Effort: Pulmonary effort is normal.      Breath sounds: Normal breath sounds.   Abdominal:      General: Bowel sounds are normal.      Palpations: Abdomen is soft.   Genitourinary:         Comments: Herpetic appearing lesions  Musculoskeletal:         General: Normal range of motion.   Skin:     Findings: No rash.   Neurological:      General: No focal deficit present.      Mental Status: She is alert and oriented to person, place, and time. Mental status is at baseline.   Psychiatric:         Mood and Affect: Mood normal.         Behavior: Behavior normal.          Assessment and Plan (including Health Maintenance)      Problem List  Smart Adaptive Payments  Document Outside HM   :    Plan:         Health Maintenance Due   Topic Date Due    Hepatitis C Screening  Never done    Lipid Panel  Never done    HPV Vaccines (1 - 2-dose series) Never done    TETANUS VACCINE  Never done    Influenza Vaccine (1) Never done    COVID-19 Vaccine (3 - 2023-24 season) 09/01/2023       Problem List Items Addressed This Visit    None  Visit Diagnoses       Herpes simplex vulvovaginitis    -  Primary    Relevant Medications    valACYclovir (VALTREX) 500 MG tablet    Dysuria              Herpes simplex vulvovaginitis  -     valACYclovir (VALTREX) 500 MG tablet; Take 1 tablet (500 mg total) by mouth 3 (three) times daily.  Dispense: 21 tablet; Refill: 1    Dysuria       Health Maintenance Topics with due status: Not Due       Topic Last Completion Date    Chlamydia Screening 07/17/2023           No future appointments.     No follow-ups on file.     Signature:  Bonita Gao DNP, FNP  [facility]  [address] [place]  [address]  Phone #: [phone]  Fax #: [phone]    Date of encounter: 11/14/23    Patient Instructions   Treat outbreaks as needed. Use condoms during sexual activity. Make sexual partners aware.

## 2024-02-29 DIAGNOSIS — A60.04 HERPES SIMPLEX VULVOVAGINITIS: ICD-10-CM

## 2024-02-29 RX ORDER — VALACYCLOVIR HYDROCHLORIDE 500 MG/1
500 TABLET, FILM COATED ORAL 3 TIMES DAILY
Qty: 21 TABLET | Refills: 1 | Status: SHIPPED | OUTPATIENT
Start: 2024-02-29 | End: 2024-03-05 | Stop reason: SDUPTHER

## 2024-03-05 ENCOUNTER — OFFICE VISIT (OUTPATIENT)
Dept: FAMILY MEDICINE | Facility: CLINIC | Age: 19
End: 2024-03-05
Payer: MEDICAID

## 2024-03-05 VITALS
HEIGHT: 62 IN | OXYGEN SATURATION: 100 % | RESPIRATION RATE: 18 BRPM | WEIGHT: 163.81 LBS | BODY MASS INDEX: 30.14 KG/M2 | TEMPERATURE: 99 F | SYSTOLIC BLOOD PRESSURE: 117 MMHG | HEART RATE: 68 BPM | DIASTOLIC BLOOD PRESSURE: 72 MMHG

## 2024-03-05 DIAGNOSIS — A60.04 HERPES SIMPLEX VULVOVAGINITIS: ICD-10-CM

## 2024-03-05 PROCEDURE — 3008F BODY MASS INDEX DOCD: CPT | Mod: CPTII,,, | Performed by: NURSE PRACTITIONER

## 2024-03-05 PROCEDURE — 99214 OFFICE O/P EST MOD 30 MIN: CPT | Mod: ,,, | Performed by: NURSE PRACTITIONER

## 2024-03-05 PROCEDURE — 3074F SYST BP LT 130 MM HG: CPT | Mod: CPTII,,, | Performed by: NURSE PRACTITIONER

## 2024-03-05 PROCEDURE — 3078F DIAST BP <80 MM HG: CPT | Mod: CPTII,,, | Performed by: NURSE PRACTITIONER

## 2024-03-05 PROCEDURE — 1159F MED LIST DOCD IN RCRD: CPT | Mod: CPTII,,, | Performed by: NURSE PRACTITIONER

## 2024-03-05 PROCEDURE — 1160F RVW MEDS BY RX/DR IN RCRD: CPT | Mod: CPTII,,, | Performed by: NURSE PRACTITIONER

## 2024-03-05 RX ORDER — VALACYCLOVIR HYDROCHLORIDE 500 MG/1
500 TABLET, FILM COATED ORAL 3 TIMES DAILY
Qty: 21 TABLET | Refills: 2 | Status: SHIPPED | OUTPATIENT
Start: 2024-03-05 | End: 2025-03-05

## 2024-03-05 NOTE — PROGRESS NOTES
Bonita Gao DNP, FNP    [address] [place] [address]     PATIENT NAME: Shayy Mehta  : 2005  DATE: 3/5/24  MRN: [id number]      Billing Provider: Bonita Gao DNP, FNP  Level of Service:   Patient PCP Information       Provider PCP Type    Bonita Gao DNP, FNP General            Reason for Visit / Chief Complaint: Medication Refill       Update PCP  Update Chief Complaint         History of Present Illness / Problem Focused Workflow     Shayy Mehta presents to the clinic with Medication Refill   Pt is currently having breakout of genital herpes.     Medication Refill  Pertinent negatives include no abdominal pain, arthralgias, change in bowel habit, chest pain, chills, congestion, coughing, fatigue, fever, headaches, myalgias, nausea, rash, sore throat, vomiting or weakness.       Review of Systems     Review of Systems   Constitutional:  Negative for activity change, appetite change, chills, fatigue and fever.   HENT:  Negative for nasal congestion, ear pain, hearing loss, postnasal drip and sore throat.    Respiratory:  Negative for cough, chest tightness, shortness of breath and wheezing.    Cardiovascular:  Negative for chest pain, palpitations, leg swelling and claudication.   Gastrointestinal:  Negative for abdominal pain, change in bowel habit, constipation, diarrhea, nausea and vomiting.   Genitourinary:  Positive for dysuria and genital sores.   Musculoskeletal:  Negative for arthralgias, back pain, gait problem and myalgias.   Integumentary:  Negative for rash.   Neurological:  Negative for weakness and headaches.   Psychiatric/Behavioral:  Negative for suicidal ideas. The patient is not nervous/anxious.         Medical / Social / Family History     Past Medical History:   Diagnosis Date    Asthma        Past Surgical History:   Procedure Laterality Date     SECTION N/A 8/15/2023    Procedure:  SECTION;  Surgeon:  "Jhonatan Briscoe MD;  Location: [facility] L&D;  Service: OB/GYN;  Laterality: N/A;    WISDOM TOOTH EXTRACTION         Social History  Ms. Shayy Mehta  reports that she has never smoked. She has never been exposed to tobacco smoke. She has never used smokeless tobacco. She reports that she does not drink alcohol and does not use drugs.    Family History  Ms. Shayy Mehta's family history includes Breast cancer in an other family member; Diabetes in her paternal grandmother and another family member; Hypertension in an other family member.    Medications and Allergies     Medications  Outpatient Medications Marked as Taking for the 3/5/24 encounter (Office Visit) with Bonita Gao, LUZ, FNP   Medication Sig Dispense Refill    [DISCONTINUED] valACYclovir (VALTREX) 500 MG tablet Take 1 tablet (500 mg total) by mouth 3 (three) times daily. 21 tablet 1       Allergies  Review of patient's allergies indicates:  No Known Allergies    Physical Examination     Vitals:    03/05/24 1536   BP: 117/72   BP Location: Left arm   Patient Position: Sitting   BP Method: Large (Automatic)   Pulse: 68   Resp: 18   Temp: 98.5 °F (36.9 °C)   TempSrc: Oral   SpO2: 100%   Weight: 74.3 kg (163 lb 12.8 oz)   Height: 5' 2" (1.575 m)     Physical Exam  Vitals and nursing note reviewed.   Constitutional:       General: She is not in acute distress.  HENT:      Nose: Nose normal.      Mouth/Throat:      Mouth: Mucous membranes are moist.   Eyes:      Pupils: Pupils are equal, round, and reactive to light.   Cardiovascular:      Rate and Rhythm: Normal rate and regular rhythm.      Pulses: Normal pulses.      Heart sounds: Normal heart sounds. No murmur heard.  Pulmonary:      Effort: Pulmonary effort is normal. No respiratory distress.      Breath sounds: Normal breath sounds. No wheezing, rhonchi or rales.   Chest:      Chest wall: No tenderness.   Abdominal:      General: Bowel sounds are normal.      Palpations: Abdomen is " soft.   Musculoskeletal:         General: Normal range of motion.      Cervical back: Normal range of motion and neck supple.      Right lower leg: No edema.      Left lower leg: No edema.   Skin:     General: Skin is warm and dry.   Neurological:      General: No focal deficit present.      Mental Status: She is alert and oriented to person, place, and time.          Assessment and Plan (including Health Maintenance)      Problem List  Smart Sets  Document Outside HM   :    Plan:   Pelvic exam deferred due to pt has hx and has had previous breakouts.      Health Maintenance Due   Topic Date Due    Hepatitis C Screening  Never done    Lipid Panel  Never done    HPV Vaccines (1 - 2-dose series) Never done    TETANUS VACCINE  Never done    COVID-19 Vaccine (3 - 2023-24 season) 09/01/2023       Problem List Items Addressed This Visit    None  Visit Diagnoses       Herpes simplex vulvovaginitis        Relevant Medications    valACYclovir (VALTREX) 500 MG tablet          Herpes simplex vulvovaginitis  -     valACYclovir (VALTREX) 500 MG tablet; Take 1 tablet (500 mg total) by mouth 3 (three) times daily.  Dispense: 21 tablet; Refill: 2       Health Maintenance Topics with due status: Not Due       Topic Last Completion Date    Chlamydia Screening 07/17/2023           No future appointments.       Follow up if symptoms worsen or fail to improve.     Signature:  Bonita aGo DNP, FNP  [address] [place] [address]  Phone #: [phone]  Fax #: [phone]    Date of encounter: 3/5/24    Patient Instructions   Always use protection to prevent STDs.

## 2024-07-22 PROBLEM — Z3A.40 40 WEEKS GESTATION OF PREGNANCY: Status: RESOLVED | Noted: 2023-08-15 | Resolved: 2024-07-22

## 2024-10-15 ENCOUNTER — OFFICE VISIT (OUTPATIENT)
Dept: FAMILY MEDICINE | Facility: CLINIC | Age: 19
End: 2024-10-15
Payer: MEDICAID

## 2024-10-15 VITALS
RESPIRATION RATE: 18 BRPM | HEART RATE: 76 BPM | BODY MASS INDEX: 32.2 KG/M2 | TEMPERATURE: 98 F | SYSTOLIC BLOOD PRESSURE: 109 MMHG | DIASTOLIC BLOOD PRESSURE: 69 MMHG | WEIGHT: 175 LBS | OXYGEN SATURATION: 97 % | HEIGHT: 62 IN

## 2024-10-15 DIAGNOSIS — N89.8 VAGINAL DISCHARGE: Primary | ICD-10-CM

## 2024-10-15 DIAGNOSIS — Z3A.36 36 WEEKS GESTATION OF PREGNANCY: ICD-10-CM

## 2024-10-15 LAB
BILIRUB SERPL-MCNC: NEGATIVE MG/DL
BLOOD URINE, POC: NEGATIVE
CANDIDA SPECIES: POSITIVE
CLARITY, UA: ABNORMAL
COLOR, UA: ABNORMAL
GARDNERELLA: NEGATIVE
GLUCOSE UR QL STRIP: NEGATIVE
KETONES UR QL STRIP: NEGATIVE
LEUKOCYTE ESTERASE URINE, POC: ABNORMAL
NITRITE, POC UA: NEGATIVE
PH, POC UA: 7
PROTEIN, POC: ABNORMAL
SPECIFIC GRAVITY, POC UA: 1.02
TRICHOMONAS: NEGATIVE
UROBILINOGEN, POC UA: 1

## 2024-10-15 PROCEDURE — 81003 URINALYSIS AUTO W/O SCOPE: CPT | Mod: RHCUB | Performed by: NURSE PRACTITIONER

## 2024-10-15 PROCEDURE — 87510 GARDNER VAG DNA DIR PROBE: CPT | Mod: ,,, | Performed by: CLINICAL MEDICAL LABORATORY

## 2024-10-15 PROCEDURE — 1159F MED LIST DOCD IN RCRD: CPT | Mod: CPTII,,, | Performed by: NURSE PRACTITIONER

## 2024-10-15 PROCEDURE — 3008F BODY MASS INDEX DOCD: CPT | Mod: CPTII,,, | Performed by: NURSE PRACTITIONER

## 2024-10-15 PROCEDURE — 99214 OFFICE O/P EST MOD 30 MIN: CPT | Mod: ,,, | Performed by: NURSE PRACTITIONER

## 2024-10-15 PROCEDURE — 87591 N.GONORRHOEAE DNA AMP PROB: CPT | Mod: ,,, | Performed by: CLINICAL MEDICAL LABORATORY

## 2024-10-15 PROCEDURE — 1160F RVW MEDS BY RX/DR IN RCRD: CPT | Mod: CPTII,,, | Performed by: NURSE PRACTITIONER

## 2024-10-15 PROCEDURE — 3078F DIAST BP <80 MM HG: CPT | Mod: CPTII,,, | Performed by: NURSE PRACTITIONER

## 2024-10-15 PROCEDURE — 87491 CHLMYD TRACH DNA AMP PROBE: CPT | Mod: ,,, | Performed by: CLINICAL MEDICAL LABORATORY

## 2024-10-15 PROCEDURE — 87480 CANDIDA DNA DIR PROBE: CPT | Mod: ,,, | Performed by: CLINICAL MEDICAL LABORATORY

## 2024-10-15 PROCEDURE — 3074F SYST BP LT 130 MM HG: CPT | Mod: CPTII,,, | Performed by: NURSE PRACTITIONER

## 2024-10-15 PROCEDURE — 87660 TRICHOMONAS VAGIN DIR PROBE: CPT | Mod: ,,, | Performed by: CLINICAL MEDICAL LABORATORY

## 2024-10-15 NOTE — PROGRESS NOTES
Bonita Gao DNP, FNP    [address] [place] [address]     PATIENT NAME: Shayy Mehta  : 2005  DATE: 10/15/24  MRN: [id number]      Billing Provider: Bonita Gao DNP, FNP  Level of Service:   Patient PCP Information       Provider PCP Type    Bonita Gao DNP, FNP General            Reason for Visit / Chief Complaint: Vaginal Discharge (Patient is here today with c/o vaginal discharge.  It first started about 2 weeks ago, and the discharge was white so she thought it might be a yeast infection.  Yesterday, she noticed the discharge was yellow/green in color.  It does have an odor, and itches.  She had an old prescription of nystatin cream that she has applied to the outside area for the irritation, but it has not helped.She is 36 week pregnant, and is seeing a specialist in [place].)       Update PCP  Update Chief Complaint         History of Present Illness / Problem Focused Workflow     Shayy Mehta presents to the clinic with Vaginal Discharge (Patient is here today with c/o vaginal discharge.  It first started about 2 weeks ago, and the discharge was white so she thought it might be a yeast infection.  Yesterday, she noticed the discharge was yellow/green in color.  It does have an odor, and itches.  She had an old prescription of nystatin cream that she has applied to the outside area for the irritation, but it has not helped.She is 36 week pregnant, and is seeing a specialist in [place].)         Review of Systems     Review of Systems   Constitutional:  Negative for appetite change, fatigue, fever and unexpected weight change.   HENT:  Negative for hearing loss.    Eyes:  Negative for visual disturbance.   Respiratory:  Negative for shortness of breath.    Cardiovascular:  Negative for chest pain.   Gastrointestinal:  Negative for abdominal pain, constipation, diarrhea, nausea and vomiting.   Genitourinary:  Positive for vaginal discharge.  "Negative for dysuria.   Musculoskeletal:  Negative for back pain.   Psychiatric/Behavioral:  Negative for sleep disturbance.         Medical / Social / Family History     Past Medical History:   Diagnosis Date    Asthma        Past Surgical History:   Procedure Laterality Date     SECTION N/A 8/15/2023    Procedure:  SECTION;  Surgeon: Jhonatan Briscoe MD;  Location: [facility] L&D;  Service: OB/GYN;  Laterality: N/A;    WISDOM TOOTH EXTRACTION         Social History  Ms. Shayy Mehta  reports that she has never smoked. She has never been exposed to tobacco smoke. She has never used smokeless tobacco. She reports that she does not drink alcohol and does not use drugs.    Family History  Ms. Shayy Mehta's family history includes Breast cancer in an other family member; Diabetes in her paternal grandmother and another family member; Hypertension in an other family member.    Medications and Allergies     Medications  Outpatient Medications Marked as Taking for the 10/15/24 encounter (Office Visit) with Bonita Gao, LUZ, FNP   Medication Sig Dispense Refill    valACYclovir (VALTREX) 500 MG tablet Take 1 tablet (500 mg total) by mouth 3 (three) times daily. 21 tablet 2       Allergies  Review of patient's allergies indicates:  No Known Allergies    Physical Examination     Vitals:    10/15/24 1335   BP: 109/69   Pulse: 76   Resp: 18   Temp: 98.3 °F (36.8 °C)   TempSrc: Oral   SpO2: 97%   Weight: 79.4 kg (175 lb)   Height: 5' 2" (1.575 m)     Physical Exam  Vitals and nursing note reviewed.   Constitutional:       General: She is not in acute distress.  HENT:      Nose: Nose normal.      Mouth/Throat:      Mouth: Mucous membranes are moist.   Eyes:      Pupils: Pupils are equal, round, and reactive to light.   Cardiovascular:      Rate and Rhythm: Normal rate and regular rhythm.      Pulses: Normal pulses.      Heart sounds: Normal heart sounds. No murmur heard.  Pulmonary:      Effort: " Pulmonary effort is normal. No respiratory distress.      Breath sounds: Normal breath sounds. No wheezing, rhonchi or rales.   Chest:      Chest wall: No tenderness.   Abdominal:      General: Bowel sounds are normal.      Palpations: Abdomen is soft.   Genitourinary:     Vagina: Vaginal discharge present.      Cervix: Discharge present.   Musculoskeletal:         General: Normal range of motion.      Cervical back: Normal range of motion and neck supple.      Right lower leg: No edema.      Left lower leg: No edema.   Skin:     General: Skin is warm and dry.   Neurological:      General: No focal deficit present.      Mental Status: She is alert and oriented to person, place, and time.          Assessment and Plan (including Health Maintenance)      Problem List  Smart [employer]  Document Outside HM   :    Plan:         Health Maintenance Due   Topic Date Due    Lipid Panel  Never done    HPV Vaccines (1 - 3-dose series) Never done    Chlamydia Screening  07/17/2024    COVID-19 Vaccine (4 - 2024-25 season) 09/01/2024    Pap Smear  10/01/2024       Problem List Items Addressed This Visit    None  Visit Diagnoses       Vaginal discharge    -  Primary    Relevant Orders    POCT URINALYSIS W/O SCOPE (Completed)    Chlamydia/GC, PCR    Bacterial Vaginosis    36 weeks gestation of pregnancy              Vaginal discharge  -     POCT URINALYSIS W/O SCOPE  -     Chlamydia/GC, PCR; Future; Expected date: 10/15/2024  -     Bacterial Vaginosis    36 weeks gestation of pregnancy       Health Maintenance Topics with due status: Not Due       Topic Last Completion Date    TETANUS VACCINE 09/19/2024    RSV Vaccine (Age 60+ and Pregnant patients) Not Due           No future appointments.     Follow up if symptoms worsen or fail to improve.     Signature:  Bonita Gao DNP, FNP  [address] [place] [address]  Phone #: [phone]  Fax #: [phone]    Date of encounter:  10/15/24    Patient Instructions   Await labs.

## 2024-10-16 LAB
CHLAMYDIA BY PCR: NEGATIVE
N. GONORRHOEAE (GC) BY PCR: NEGATIVE

## 2025-01-23 LAB
PAP RECOMMENDATION EXT: NORMAL
PAP SMEAR: NORMAL

## 2025-03-10 ENCOUNTER — OFFICE VISIT (OUTPATIENT)
Dept: FAMILY MEDICINE | Facility: CLINIC | Age: 20
End: 2025-03-10
Payer: MEDICAID

## 2025-03-10 VITALS
DIASTOLIC BLOOD PRESSURE: 86 MMHG | RESPIRATION RATE: 18 BRPM | TEMPERATURE: 97 F | OXYGEN SATURATION: 99 % | SYSTOLIC BLOOD PRESSURE: 136 MMHG | WEIGHT: 168 LBS | BODY MASS INDEX: 30.91 KG/M2 | HEIGHT: 62 IN | HEART RATE: 69 BPM

## 2025-03-10 DIAGNOSIS — B96.89 BACTERIAL VAGINOSIS: Primary | ICD-10-CM

## 2025-03-10 DIAGNOSIS — Z72.51 HIGH RISK HETEROSEXUAL BEHAVIOR: ICD-10-CM

## 2025-03-10 DIAGNOSIS — A59.9 TRICHIMONIASIS: ICD-10-CM

## 2025-03-10 DIAGNOSIS — N76.0 BACTERIAL VAGINOSIS: Primary | ICD-10-CM

## 2025-03-10 DIAGNOSIS — N89.8 VAGINAL DISCHARGE: ICD-10-CM

## 2025-03-10 DIAGNOSIS — A60.04 HERPES SIMPLEX VULVOVAGINITIS: ICD-10-CM

## 2025-03-10 LAB
BACTERIAL VAGINOSIS DNA (OHS): POSITIVE
BILIRUB UR QL STRIP: NEGATIVE
CANDIDA GLABRATA/KRUSEI DNA (OHS): NOT DETECTED
CANDIDA SPECIES DNA (OHS): NOT DETECTED
CHLAMYDIA BY PCR: NEGATIVE
CLARITY UR: CLEAR
COLOR UR: NORMAL
GLUCOSE UR STRIP-MCNC: NORMAL MG/DL
KETONES UR STRIP-SCNC: NEGATIVE MG/DL
LEUKOCYTE ESTERASE UR QL STRIP: NEGATIVE
N. GONORRHOEAE (GC) BY PCR: NEGATIVE
NITRITE UR QL STRIP: NEGATIVE
PH UR STRIP: 7.5 PH UNITS
PROT UR QL STRIP: NEGATIVE
RBC # UR STRIP: NEGATIVE /UL
SP GR UR STRIP: 1.02
TRICHOMONAS VAGINALIS DNA (OHS): DETECTED
UROBILINOGEN UR STRIP-ACNC: NORMAL MG/DL

## 2025-03-10 PROCEDURE — 1159F MED LIST DOCD IN RCRD: CPT | Mod: CPTII,,, | Performed by: NURSE PRACTITIONER

## 2025-03-10 PROCEDURE — 1160F RVW MEDS BY RX/DR IN RCRD: CPT | Mod: CPTII,,, | Performed by: NURSE PRACTITIONER

## 2025-03-10 PROCEDURE — 3008F BODY MASS INDEX DOCD: CPT | Mod: CPTII,,, | Performed by: NURSE PRACTITIONER

## 2025-03-10 PROCEDURE — 87491 CHLMYD TRACH DNA AMP PROBE: CPT | Mod: ,,, | Performed by: CLINICAL MEDICAL LABORATORY

## 2025-03-10 PROCEDURE — 81003 URINALYSIS AUTO W/O SCOPE: CPT | Mod: QW,,, | Performed by: CLINICAL MEDICAL LABORATORY

## 2025-03-10 PROCEDURE — 3075F SYST BP GE 130 - 139MM HG: CPT | Mod: CPTII,,, | Performed by: NURSE PRACTITIONER

## 2025-03-10 PROCEDURE — 81515 NFCT DS BV&VAGINITIS DNA ALG: CPT | Mod: QW,,, | Performed by: CLINICAL MEDICAL LABORATORY

## 2025-03-10 PROCEDURE — 3079F DIAST BP 80-89 MM HG: CPT | Mod: CPTII,,, | Performed by: NURSE PRACTITIONER

## 2025-03-10 PROCEDURE — 87591 N.GONORRHOEAE DNA AMP PROB: CPT | Mod: ,,, | Performed by: CLINICAL MEDICAL LABORATORY

## 2025-03-10 PROCEDURE — 99214 OFFICE O/P EST MOD 30 MIN: CPT | Mod: ,,, | Performed by: NURSE PRACTITIONER

## 2025-03-10 RX ORDER — BUDESONIDE AND FORMOTEROL FUMARATE DIHYDRATE 80; 4.5 UG/1; UG/1
2 AEROSOL RESPIRATORY (INHALATION) 2 TIMES DAILY
COMMUNITY
Start: 2024-08-15 | End: 2025-03-11

## 2025-03-10 RX ORDER — DOCUSATE SODIUM 100 MG/1
100 CAPSULE, LIQUID FILLED ORAL DAILY PRN
COMMUNITY
Start: 2024-11-06 | End: 2025-03-11

## 2025-03-10 RX ORDER — VALACYCLOVIR HYDROCHLORIDE 500 MG/1
500 TABLET, FILM COATED ORAL 3 TIMES DAILY
Qty: 21 TABLET | Refills: 2 | Status: SHIPPED | OUTPATIENT
Start: 2025-03-10 | End: 2026-03-10

## 2025-03-10 RX ORDER — FERROUS SULFATE 325(65) MG
1 TABLET ORAL DAILY
COMMUNITY
Start: 2024-11-06 | End: 2025-03-11

## 2025-03-10 RX ORDER — IBUPROFEN 800 MG/1
800 TABLET ORAL EVERY 6 HOURS PRN
COMMUNITY
Start: 2024-11-06 | End: 2025-03-11

## 2025-03-10 NOTE — PROGRESS NOTES
Bonita Gao DNP, FNP    [address] [place] [address]     PATIENT NAME: Shayy Mehta  : 2005  DATE: 3/10/25  MRN: [id number]      Billing Provider: Bonita Gao DNP, FNP  Level of Service:   Patient PCP Information       Provider PCP Type    Bonita Gao DNP, FNP General            Reason for Visit / Chief Complaint: Vaginal Discharge (Describes as water discharge that is cloudy white/clear.  Has been present for approx 4 days.  She says she has had one new sexual partner since her last STD testing in Oct. 2024 and has the same one from then as well.  She says she just had a herpes outbreak and doesn't know if this discharge could be related to that.  She just took Valtrex for the HSV.) and vaginal odor (Reports fishy smell)       Update PCP  Update Chief Complaint         History of Present Illness / Problem Focused Workflow     Shayy Mehta presents to the clinic with Vaginal Discharge (Describes as water discharge that is cloudy white/clear.  Has been present for approx 4 days.  She says she has had one new sexual partner since her last STD testing in Oct. 2024 and has the same one from then as well.  She says she just had a herpes outbreak and doesn't know if this discharge could be related to that.  She just took Valtrex for the HSV.) and vaginal odor (Reports fishy smell)     Vaginal Discharge  The patient's primary symptoms include vaginal discharge. Pertinent negatives include no abdominal pain, back pain, chills, constipation, diarrhea, dysuria, fever, headaches, hematuria, nausea, rash, sore throat or vomiting.       Review of Systems     Review of Systems   Constitutional:  Positive for activity change. Negative for appetite change, chills, fatigue, fever and unexpected weight change.   HENT:  Negative for nasal congestion, ear pain, hearing loss, postnasal drip, rhinorrhea, sore throat and trouble swallowing.    Eyes:  Negative for  discharge and visual disturbance.   Respiratory:  Negative for cough, chest tightness, shortness of breath and wheezing.    Cardiovascular:  Negative for chest pain, palpitations, leg swelling and claudication.   Gastrointestinal:  Negative for abdominal pain, blood in stool, change in bowel habit, constipation, diarrhea, nausea and vomiting.   Endocrine: Negative for polydipsia and polyuria.   Genitourinary:  Positive for vaginal discharge. Negative for difficulty urinating, dysuria, hematuria and menstrual problem.   Musculoskeletal:  Negative for arthralgias, back pain, gait problem, joint swelling, myalgias and neck pain.   Integumentary:  Negative for rash.   Neurological:  Negative for weakness and headaches.   Psychiatric/Behavioral:  Negative for confusion, dysphoric mood and suicidal ideas. The patient is not nervous/anxious.         Medical / Social / Family History     Past Medical History:   Diagnosis Date    Asthma        Past Surgical History:   Procedure Laterality Date     SECTION N/A 8/15/2023    Procedure:  SECTION;  Surgeon: Jhonatan Briscoe MD;  Location: [facility] L&D;  Service: OB/GYN;  Laterality: N/A;    WISDOM TOOTH EXTRACTION         Social History  Ms. Shayy Mehta  reports that she has never smoked. She has never been exposed to tobacco smoke. She has never used smokeless tobacco. She reports that she does not drink alcohol and does not use drugs.    Family History  Ms. Shayy Mehta's family history includes Breast cancer in an other family member; Diabetes in her paternal grandmother and another family member; Hypertension in an other family member.    Medications and Allergies     Medications  Outpatient Medications Marked as Taking for the 3/10/25 encounter (Office Visit) with Bonita Gao, LUZ, FNP   Medication Sig Dispense Refill    [DISCONTINUED] valACYclovir (VALTREX) 500 MG tablet Take 1 tablet (500 mg total) by mouth 3 (three) times daily. 21  "tablet 2       Allergies  Review of patient's allergies indicates:  No Known Allergies    Physical Examination     Vitals:    03/10/25 1418   BP: 136/86   BP Location: Left arm   Patient Position: Sitting   Pulse: 69   Resp: 18   Temp: 97.4 °F (36.3 °C)   TempSrc: Oral   SpO2: 99%   Weight: 76.2 kg (168 lb)   Height: 5' 2" (1.575 m)     Physical Exam  Vitals and nursing note reviewed.   Constitutional:       General: She is not in acute distress.  HENT:      Nose: Nose normal.      Mouth/Throat:      Mouth: Mucous membranes are moist.   Eyes:      Pupils: Pupils are equal, round, and reactive to light.   Cardiovascular:      Rate and Rhythm: Normal rate and regular rhythm.      Pulses: Normal pulses.      Heart sounds: Normal heart sounds. No murmur heard.  Pulmonary:      Effort: Pulmonary effort is normal. No respiratory distress.      Breath sounds: Normal breath sounds. No wheezing, rhonchi or rales.   Chest:      Chest wall: No tenderness.   Abdominal:      General: Bowel sounds are normal.      Palpations: Abdomen is soft.   Musculoskeletal:         General: Normal range of motion.      Cervical back: Normal range of motion and neck supple.      Right lower leg: No edema.      Left lower leg: No edema.   Skin:     General: Skin is warm and dry.   Neurological:      General: No focal deficit present.      Mental Status: She is alert and oriented to person, place, and time.          Assessment and Plan (including Health Maintenance)      Problem List  Smart [employer]  Document Outside HM   :    Plan:         Health Maintenance Due   Topic Date Due    Lipid Panel  Never done    HPV Vaccines (1 - 3-dose series) Never done    COVID-19 Vaccine (4 - 2024-25 season) 09/01/2024    Pap Smear  Never done       Problem List Items Addressed This Visit    None  Visit Diagnoses         Bacterial vaginosis    -  Primary    Relevant Medications    metroNIDAZOLE (FLAGYL) 500 MG tablet      Herpes simplex vulvovaginitis        " Relevant Medications    valACYclovir (VALTREX) 500 MG tablet      Vaginal discharge        Relevant Orders    Chlamydia/GC, PCR (Completed)    Vaginosis Screen by DNA Probe (Completed)    Urinalysis, Reflex to Urine Culture (Completed)      High risk heterosexual behavior        Relevant Orders    Chlamydia/GC, PCR (Completed)    Vaginosis Screen by DNA Probe (Completed)    Urinalysis, Reflex to Urine Culture (Completed)      Trichimoniasis        Relevant Medications    metroNIDAZOLE (FLAGYL) 500 MG tablet          Bacterial vaginosis  -     metroNIDAZOLE (FLAGYL) 500 MG tablet; Take 1 tablet (500 mg total) by mouth every 8 (eight) hours.  Dispense: 21 tablet; Refill: 0    Herpes simplex vulvovaginitis  -     valACYclovir (VALTREX) 500 MG tablet; Take 1 tablet (500 mg total) by mouth 3 (three) times daily.  Dispense: 21 tablet; Refill: 2    Vaginal discharge  -     Chlamydia/GC, PCR  -     Vaginosis Screen by DNA Probe; Future; Expected date: 03/10/2025  -     Urinalysis, Reflex to Urine Culture    High risk heterosexual behavior  -     Chlamydia/GC, PCR  -     Vaginosis Screen by DNA Probe; Future; Expected date: 03/10/2025  -     Urinalysis, Reflex to Urine Culture    Trichimoniasis  -     metroNIDAZOLE (FLAGYL) 500 MG tablet; Take 1 tablet (500 mg total) by mouth every 8 (eight) hours.  Dispense: 21 tablet; Refill: 0       Health Maintenance Topics with due status: Not Due       Topic Last Completion Date    TETANUS VACCINE 09/19/2024    Chlamydia Screening 03/10/2025    RSV Vaccine (Age 60+ and Pregnant patients) Not Due         No future appointments.     Follow up if symptoms worsen or fail to improve.     Signature:  Bonita Gao DNP, FNP  [address] [place] [address]  Phone #: [phone]  Fax #: [phone]    Date of encounter: 3/10/25    Patient Instructions   Await lab results. Avoid tub baths. Avoid fragrant soaps. Avoid douching. Drink fresh squeezed  lemon water. Woman probiotics daily.  Safe sex practices.

## 2025-03-11 ENCOUNTER — RESULTS FOLLOW-UP (OUTPATIENT)
Dept: FAMILY MEDICINE | Facility: CLINIC | Age: 20
End: 2025-03-11

## 2025-03-11 RX ORDER — METRONIDAZOLE 500 MG/1
500 TABLET ORAL EVERY 8 HOURS
Qty: 21 TABLET | Refills: 0 | Status: SHIPPED | OUTPATIENT
Start: 2025-03-11

## 2025-05-16 ENCOUNTER — OFFICE VISIT (OUTPATIENT)
Dept: FAMILY MEDICINE | Facility: CLINIC | Age: 20
End: 2025-05-16
Payer: MEDICAID

## 2025-05-16 VITALS
DIASTOLIC BLOOD PRESSURE: 67 MMHG | RESPIRATION RATE: 18 BRPM | HEART RATE: 95 BPM | SYSTOLIC BLOOD PRESSURE: 114 MMHG | BODY MASS INDEX: 31.1 KG/M2 | TEMPERATURE: 99 F | WEIGHT: 169 LBS | OXYGEN SATURATION: 99 % | HEIGHT: 62 IN

## 2025-05-16 DIAGNOSIS — Z12.4 SCREENING FOR CERVICAL CANCER: ICD-10-CM

## 2025-05-16 DIAGNOSIS — Z32.01 POSITIVE PREGNANCY TEST: Primary | ICD-10-CM

## 2025-05-16 DIAGNOSIS — N91.2 AMENORRHEA: ICD-10-CM

## 2025-05-16 LAB
B-HCG UR QL: POSITIVE
CTP QC/QA: YES

## 2025-05-16 PROCEDURE — 3074F SYST BP LT 130 MM HG: CPT | Mod: CPTII,,, | Performed by: NURSE PRACTITIONER

## 2025-05-16 PROCEDURE — 3078F DIAST BP <80 MM HG: CPT | Mod: CPTII,,, | Performed by: NURSE PRACTITIONER

## 2025-05-16 PROCEDURE — 99213 OFFICE O/P EST LOW 20 MIN: CPT | Mod: ,,, | Performed by: NURSE PRACTITIONER

## 2025-05-16 PROCEDURE — 1160F RVW MEDS BY RX/DR IN RCRD: CPT | Mod: CPTII,,, | Performed by: NURSE PRACTITIONER

## 2025-05-16 PROCEDURE — 81025 URINE PREGNANCY TEST: CPT | Mod: RHCUB | Performed by: NURSE PRACTITIONER

## 2025-05-16 PROCEDURE — 1159F MED LIST DOCD IN RCRD: CPT | Mod: CPTII,,, | Performed by: NURSE PRACTITIONER

## 2025-05-16 PROCEDURE — 3008F BODY MASS INDEX DOCD: CPT | Mod: CPTII,,, | Performed by: NURSE PRACTITIONER

## 2025-05-16 NOTE — PROGRESS NOTES
Bonita Gao DNP, FNP    [address] [place] [address]     PATIENT NAME: Shayy Mehta  : 2005  DATE: 25  MRN: [id number]      Billing Provider: Bonita Gao DNP, FNP  Level of Service:   Patient PCP Information       Provider PCP Type    Bonita Gao DNP, FNP General            Reason for Visit / Chief Complaint: Follow-up (She got STD testing at her last appt. She is here for a follow up on that.)       Update PCP  Update Chief Complaint         History of Present Illness / Problem Focused Workflow     Shayy Mehta presents to the clinic with Follow-up (She got STD testing at her last appt. She is here for a follow up on that.)     LMP 3/1/2025  Pt is sexually active and does not use any type of birth control.    Follow-up  Pertinent negatives include no abdominal pain, arthralgias, change in bowel habit, chest pain, chills, congestion, coughing, fatigue, fever, headaches, myalgias, nausea, rash, sore throat, vomiting or weakness.       Review of Systems     Review of Systems   Constitutional:  Negative for activity change, appetite change, chills, fatigue and fever.   HENT:  Negative for nasal congestion, ear pain, hearing loss, postnasal drip and sore throat.    Respiratory:  Negative for cough, chest tightness, shortness of breath and wheezing.    Cardiovascular:  Negative for chest pain, palpitations, leg swelling and claudication.   Gastrointestinal:  Negative for abdominal pain, change in bowel habit, constipation, diarrhea, nausea and vomiting.   Genitourinary:  Positive for menstrual problem. Negative for dysuria.   Musculoskeletal:  Negative for arthralgias, back pain, gait problem and myalgias.   Integumentary:  Negative for rash.   Neurological:  Negative for weakness and headaches.   Psychiatric/Behavioral:  Negative for suicidal ideas. The patient is not nervous/anxious.         Medical / Social / Family History     Past Medical  "History:   Diagnosis Date    Asthma        Past Surgical History:   Procedure Laterality Date     SECTION N/A 8/15/2023    Procedure:  SECTION;  Surgeon: Jhonatan Briscoe MD;  Location: [facility] L&D;  Service: OB/GYN;  Laterality: N/A;    WISDOM TOOTH EXTRACTION         Social History  Ms. Shayy Mehta  reports that she has never smoked. She has never been exposed to tobacco smoke. She has never used smokeless tobacco. She reports that she does not drink alcohol and does not use drugs.    Family History  Ms. Shayy Mehta's family history includes Breast cancer in an other family member; Diabetes in her paternal grandmother and another family member; Hypertension in an other family member.    Medications and Allergies     Medications  Outpatient Medications Marked as Taking for the 25 encounter (Office Visit) with Bonita Gao, LUZ, FNP   Medication Sig Dispense Refill    valACYclovir (VALTREX) 500 MG tablet Take 1 tablet (500 mg total) by mouth 3 (three) times daily. 21 tablet 2       Allergies  Review of patient's allergies indicates:  No Known Allergies    Physical Examination     Vitals:    25 1614   BP: 114/67   BP Location: Right arm   Patient Position: Sitting   Pulse: 95   Resp: 18   Temp: 98.7 °F (37.1 °C)   TempSrc: Oral   SpO2: 99%   Weight: 76.7 kg (169 lb)   Height: 5' 2" (1.575 m)     Physical Exam  Vitals and nursing note reviewed.   Constitutional:       General: She is not in acute distress.     Appearance: Normal appearance. She is not ill-appearing.   Eyes:      Extraocular Movements: Extraocular movements intact.      Pupils: Pupils are equal, round, and reactive to light.   Cardiovascular:      Rate and Rhythm: Normal rate and regular rhythm.      Heart sounds: Normal heart sounds.   Pulmonary:      Effort: Pulmonary effort is normal.      Breath sounds: Normal breath sounds.   Abdominal:      General: Bowel sounds are normal.      Palpations: Abdomen " is soft.   Musculoskeletal:         General: Normal range of motion.   Skin:     Findings: No rash.   Neurological:      General: No focal deficit present.      Mental Status: She is alert and oriented to person, place, and time. Mental status is at baseline.   Psychiatric:         Mood and Affect: Mood normal.         Behavior: Behavior normal.          Assessment and Plan (including Health Maintenance)      Problem List  Smart [employer]  Document Outside HM   :    Plan:         Health Maintenance Due   Topic Date Due    Lipid Panel  Never done    COVID-19 Vaccine (4 - 2024-25 season) 09/01/2024    Pap Smear  Never done       Problem List Items Addressed This Visit    None  Visit Diagnoses         Positive pregnancy test    -  Primary      Screening for cervical cancer          Amenorrhea        Relevant Orders    POCT urine pregnancy (Completed)      Body mass index 30.0-30.9, adult              Positive pregnancy test    Screening for cervical cancer  -     Cancel: Ambulatory referral/consult to Gynecology; Future; Expected date: 05/16/2025    Amenorrhea  -     POCT urine pregnancy    Body mass index 30.0-30.9, adult       Health Maintenance Topics with due status: Not Due       Topic Last Completion Date    TETANUS VACCINE 09/19/2024    Chlamydia Screening 03/10/2025    RSV Vaccine (Age 60+ and Pregnant patients) Not Due           No future appointments.     Follow up if symptoms worsen or fail to improve.     Signature:  Bonita Gao DNP, FNP  [address] [place] [address]  Phone #: [phone]  Fax #: [phone]    Date of encounter: 5/16/25    Patient Instructions   Patient will schedule appt with obgyn. Recommend patient start prenatal vitamins asap.

## 2025-06-16 ENCOUNTER — PATIENT MESSAGE (OUTPATIENT)
Dept: ADMINISTRATIVE | Facility: HOSPITAL | Age: 20
End: 2025-06-16

## 2025-07-07 ENCOUNTER — PATIENT OUTREACH (OUTPATIENT)
Facility: HOSPITAL | Age: 20
End: 2025-07-07
Payer: MEDICAID

## 2025-07-07 NOTE — PROGRESS NOTES
Population Health Chart Review & Patient Outreach Details    Updates Requested / Reviewed:  [x]  Care Team Updated    Health Maintenance Topics Addressed and Outreach Outcomes / Actions Taken:  Cervical Cancer Screening [x] HM Updated with January 2025 Pap (Dr. Toth) abstracted from Care Everywhere. History Updated.

## (undated) DEVICE — SUT VICRYL PLUS 1 CTX 36IN

## (undated) DEVICE — SOL NACL IRR 1000ML BTL

## (undated) DEVICE — DRAPE L&D NON STERILE (ORDER 63957)

## (undated) DEVICE — HEMOSTAT SURGICEL 4X8IN

## (undated) DEVICE — PACK C SECTION RUSH

## (undated) DEVICE — ELECTRODE REM PLYHSV RETURN 9

## (undated) DEVICE — CANISTER 1200 SUCTION CCMEDI-V

## (undated) DEVICE — STRIP MEDI WND CLSR 1/4X4IN

## (undated) DEVICE — SUT 2/0 27IN PLAIN GUT CT

## (undated) DEVICE — STAPLER SKIN SUBCUTICULAR

## (undated) DEVICE — GLOVE PROTEXIS PI SYN SURG 7

## (undated) DEVICE — GLOVE PROTEXIS PI SYN SURG 6.5

## (undated) DEVICE — APPLICATOR CHLORAPREP ORN 26ML